# Patient Record
Sex: FEMALE | Race: WHITE | NOT HISPANIC OR LATINO | Employment: OTHER | URBAN - METROPOLITAN AREA
[De-identification: names, ages, dates, MRNs, and addresses within clinical notes are randomized per-mention and may not be internally consistent; named-entity substitution may affect disease eponyms.]

---

## 2021-08-09 ENCOUNTER — TRANSFERRED RECORDS (OUTPATIENT)
Dept: HEALTH INFORMATION MANAGEMENT | Facility: CLINIC | Age: 66
End: 2021-08-09

## 2021-08-11 ENCOUNTER — TRANSFERRED RECORDS (OUTPATIENT)
Dept: HEALTH INFORMATION MANAGEMENT | Facility: CLINIC | Age: 66
End: 2021-08-11

## 2021-10-04 ENCOUNTER — TRANSFERRED RECORDS (OUTPATIENT)
Dept: HEALTH INFORMATION MANAGEMENT | Facility: CLINIC | Age: 66
End: 2021-10-04

## 2022-02-23 ENCOUNTER — TRANSFERRED RECORDS (OUTPATIENT)
Dept: HEALTH INFORMATION MANAGEMENT | Facility: CLINIC | Age: 67
End: 2022-02-23

## 2022-02-23 LAB
CHOLESTEROL (EXTERNAL): 187 MG/DL
CREATININE (EXTERNAL): 0.9 MG/DL (ref 0.51–0.95)
GFR ESTIMATED (EXTERNAL): >60 ML/MIN/1.73M2
GLUCOSE (EXTERNAL): 104 MG/DL (ref 70–99)
HDLC SERPL-MCNC: 76 MG/DL
LDL CHOLESTEROL CALCULATED (EXTERNAL): 92 MG/DL
NON HDL CHOLESTEROL (EXTERNAL): 111 MG/DL (ref 0–1130)
POTASSIUM (EXTERNAL): 4.2 MMOL/L (ref 3.5–5.1)
TRIGLYCERIDES (EXTERNAL): 94 MG/DL

## 2022-05-05 ENCOUNTER — TRANSFERRED RECORDS (OUTPATIENT)
Dept: HEALTH INFORMATION MANAGEMENT | Facility: CLINIC | Age: 67
End: 2022-05-05

## 2022-07-29 ENCOUNTER — OFFICE VISIT (OUTPATIENT)
Dept: FAMILY MEDICINE | Facility: CLINIC | Age: 67
End: 2022-07-29
Payer: MEDICARE

## 2022-07-29 VITALS
OXYGEN SATURATION: 98 % | DIASTOLIC BLOOD PRESSURE: 76 MMHG | BODY MASS INDEX: 26.22 KG/M2 | SYSTOLIC BLOOD PRESSURE: 116 MMHG | TEMPERATURE: 98.7 F | WEIGHT: 148 LBS | HEIGHT: 63 IN | HEART RATE: 76 BPM | RESPIRATION RATE: 20 BRPM

## 2022-07-29 DIAGNOSIS — G47.00 INSOMNIA, UNSPECIFIED TYPE: ICD-10-CM

## 2022-07-29 DIAGNOSIS — L98.9 PRECANCEROUS SKIN LESION: ICD-10-CM

## 2022-07-29 DIAGNOSIS — Z76.89 ENCOUNTER TO ESTABLISH CARE: Primary | ICD-10-CM

## 2022-07-29 DIAGNOSIS — N95.1 VAGINAL DRYNESS, MENOPAUSAL: ICD-10-CM

## 2022-07-29 PROCEDURE — 99204 OFFICE O/P NEW MOD 45 MIN: CPT | Mod: GC

## 2022-07-29 RX ORDER — TRAZODONE HYDROCHLORIDE 50 MG/1
50 TABLET, FILM COATED ORAL AT BEDTIME
Qty: 90 TABLET | Refills: 3 | Status: SHIPPED | OUTPATIENT
Start: 2022-07-29 | End: 2023-08-15

## 2022-07-29 RX ORDER — ZOLPIDEM TARTRATE 5 MG/1
5 TABLET ORAL
Qty: 30 TABLET | Refills: 1 | Status: SHIPPED | OUTPATIENT
Start: 2022-07-29 | End: 2023-01-23

## 2022-07-29 RX ORDER — TRAZODONE HYDROCHLORIDE 50 MG/1
50 TABLET, FILM COATED ORAL AT BEDTIME
Qty: 90 UNITS | Refills: 3 | Status: SHIPPED | OUTPATIENT
Start: 2022-07-29 | End: 2022-07-29

## 2022-07-29 NOTE — PROGRESS NOTES
Assessment & Plan     Encounter to establish care  Patient is new to the area from [place], and is seeking to find a new clinic. She has followed regularly for her health prior to the move. ZOË forms were filled out, and patient plans to get scheduled for a medicare wellness visit soon.     Insomnia, unspecified type  Patient has struggled with insomnia for years. She had taken zolpidem alone for a while, and then became more dependent on it. She was able to wean herself down to only taking it a couple of times a month by regularly taking trazodone. This sleep routine works well for her. Will refill these medications today.   - zolpidem (AMBIEN) 5 MG tablet  Dispense: 30 tablet; Refill: 1  - traZODone (DESYREL) 50 MG tablet  Dispense: 90 tablet; Refill: 3    Precancerous skin lesion  Patient regularly followed with a dermatologist in [place] for precancerous skin lesions to the face that required 6 month regular check ins. She would like a referral to be established with a dermatologist here.   - Adult Dermatology Referral    Vaginal dryness, menopausal  - conjugated estrogens (PREMARIN) 0.625 MG/GM vaginal cream  Dispense: 30 g; Refill: 2    Gia Slaughter MD  [facility]    Matt Munoz is a 67 year old, presenting for the following health issues:  Establish Care ([facility] (new ))    HPI     Establish care  -  Just moved this last month from [place], [place]. Have three children; 2 [place]  just retired. She has two grandchildren, ages 5 and 2, and wanted to be able to spend more time with them. She and her  are living in [place].  -  Last time seeing a physician: followed closely with her regular provider in [place]; was last seen for a physical in the fall of 2021  -  PMH: insomnia, precancerous skin lesions  -  Any specific concerns today: Sleep; takes trazodone nightly and zolpidem as needed. Needs the  "zolpidem more when traveling, doesn't need it much when in stable schedule. Also takes Unisom and melatonin.   -  Patient also notes that she regularly uses an estrogen cream and would like a refill. She went through menopause at age 54.   -  Patient plans to return for medicare wellness visit at some point. She hasn't had a medicare wellness visit yet.   -  Prevention screening: Colonoscopy in 2018, DEXA scan in 2022, Pap smear in 2021, Mammogram in 2022    Review of Systems   Constitutional, HEENT, cardiovascular, pulmonary, gi and gu systems are negative, except as otherwise noted.      Objective    /76   Pulse 76   Temp 98.7  F (37.1  C) (Oral)   Resp 20   Ht 1.6 m (5' 2.99\")   Wt 67.1 kg (148 lb)   SpO2 98%   BMI 26.22 kg/m    Body mass index is 26.22 kg/m .  Physical Exam   GENERAL: Healthy, alert and no distress  EYES: Eyes grossly normal to inspection.  No discharge or erythema, or obvious scleral/conjunctival abnormalities.  RESP: No audible wheeze, cough, or visible cyanosis.  No visible retractions or increased work of breathing.    SKIN: Visible skin clear. No significant rash, abnormal pigmentation or lesions.  NEURO: Cranial nerves grossly intact.  Mentation and speech appropriate for age.  PSYCH: Mentation appears normal, affect normal/bright, judgement and insight intact, normal speech and appearance well-groomed.    "

## 2022-08-01 NOTE — PROGRESS NOTES
Preceptor Attestation:  Patient's case reviewed and discussed with the resident, Gia Slaughter MD, and I personally evaluated the patient. I agree with written assessment and plan of care.    Supervising Physician:  Josselyn Ordonez MD   [facility]

## 2022-08-09 ASSESSMENT — ENCOUNTER SYMPTOMS
HEMATURIA: 0
EYE PAIN: 0
FEVER: 0
JOINT SWELLING: 0
NERVOUS/ANXIOUS: 0
FREQUENCY: 1
DIZZINESS: 0
COUGH: 0
HEMATOCHEZIA: 1
WEAKNESS: 0
SORE THROAT: 0
SHORTNESS OF BREATH: 0
NAUSEA: 0
ARTHRALGIAS: 0
CONSTIPATION: 1
PALPITATIONS: 0
MYALGIAS: 0
HEARTBURN: 0
PARESTHESIAS: 0
BREAST MASS: 0
HEADACHES: 0
ABDOMINAL PAIN: 0
DIARRHEA: 0
DYSURIA: 0
CHILLS: 0

## 2022-08-09 ASSESSMENT — ACTIVITIES OF DAILY LIVING (ADL): CURRENT_FUNCTION: NO ASSISTANCE NEEDED

## 2022-08-12 ENCOUNTER — OFFICE VISIT (OUTPATIENT)
Dept: FAMILY MEDICINE | Facility: CLINIC | Age: 67
End: 2022-08-12
Payer: MEDICARE

## 2022-08-12 ENCOUNTER — OFFICE VISIT (OUTPATIENT)
Dept: FAMILY MEDICINE | Facility: CLINIC | Age: 67
End: 2022-08-12

## 2022-08-12 VITALS
RESPIRATION RATE: 18 BRPM | SYSTOLIC BLOOD PRESSURE: 120 MMHG | DIASTOLIC BLOOD PRESSURE: 74 MMHG | HEART RATE: 71 BPM | OXYGEN SATURATION: 98 % | TEMPERATURE: 97.5 F

## 2022-08-12 DIAGNOSIS — K59.00 CONSTIPATION, UNSPECIFIED CONSTIPATION TYPE: ICD-10-CM

## 2022-08-12 DIAGNOSIS — Z00.00 WELLNESS EXAMINATION: Primary | ICD-10-CM

## 2022-08-12 DIAGNOSIS — Z00.00 MEDICARE ANNUAL WELLNESS VISIT, INITIAL: Primary | ICD-10-CM

## 2022-08-12 DIAGNOSIS — Z12.31 ENCOUNTER FOR SCREENING MAMMOGRAM FOR BREAST CANCER: ICD-10-CM

## 2022-08-12 DIAGNOSIS — R73.03 PREDIABETES: ICD-10-CM

## 2022-08-12 DIAGNOSIS — R35.0 FREQUENT URINATION: ICD-10-CM

## 2022-08-12 DIAGNOSIS — Z11.59 NEED FOR HEPATITIS C SCREENING TEST: ICD-10-CM

## 2022-08-12 LAB
HBA1C MFR BLD: 5.8 % (ref 0–5.6)
HCV AB SERPL QL IA: NONREACTIVE

## 2022-08-12 PROCEDURE — G0438 PPPS, INITIAL VISIT: HCPCS | Performed by: FAMILY MEDICINE

## 2022-08-12 PROCEDURE — 99207 PR NO BILLABLE SERVICE THIS VISIT: CPT

## 2022-08-12 PROCEDURE — 83036 HEMOGLOBIN GLYCOSYLATED A1C: CPT | Performed by: FAMILY MEDICINE

## 2022-08-12 PROCEDURE — 86803 HEPATITIS C AB TEST: CPT | Performed by: FAMILY MEDICINE

## 2022-08-12 PROCEDURE — 36415 COLL VENOUS BLD VENIPUNCTURE: CPT | Performed by: FAMILY MEDICINE

## 2022-08-12 ASSESSMENT — ACTIVITIES OF DAILY LIVING (ADL): CURRENT_FUNCTION: NO ASSISTANCE NEEDED

## 2022-08-12 NOTE — PROGRESS NOTES
Medicare Wellness Visit  Health Risk Assessment           Health Risk Assessment / Review of Systems     Constitutional: Any fevers or night sweats? No     Eyes:  Vision problems   No     Hearing Do you feel you have hearing loss?  No     Cardiovascular: Any chest pain, fast or irregular heart beat, calf pain with walking?     No           Respiratory:   Any breathing problems or cough?   No     Gastrointestinal: Any stomach or stool problems?    YES - has had hx constipation x last few years. Regimen Tammy has been taking is 2 fiber pills and 2 stool softeners every night for past few years. This regimen has been effective in managing constipation.     Genitourinary: Do you have difficulty controlling urination?    YES -has noticed having to urinate more often than usual, no dysuria. Occasionally will have some urinary leakage twice a week.    Muscles and Joints: Any joint stiffness or soreness?   No     Skin: Any concerning lesions or moles?   No     Nervous System: Any loss of strength or feeling, numbness or tingling, shaking, dizziness, or headache?  No     Mental Health: Any depression, anxiety or problems sleeping?     YES -dx sleep apnea, uses CPAP. Takes Trazodone 50 mg every night and occasionally as needed would take Ambien 5mg in addition to Trazodone.    Cognition: Do you have any problems with your memory?  No            Medical Care     What other specialists or organizations are involved in your medical care?  Dermatology  Patient Care Team       Relationship Specialty Notifications Start End    Gia Slaughter MD PCP - General Student in organized health care education/training program  7/25/22     Phone: [phone] Fax: [phone]         [facility] [address]    Ghassan Whatley MD MD Dermatology  7/29/22     Phone: [phone] Fax: [phone]         [address] [facility] [address]          Have you been to the ER or overnight in the hospital in the last year?  No           Social History / Home Safety       Marital Status:  Who lives in your household? Self and , temporarily daughter, her  and 2 children-ages 3 and 5 are staying with Tammy as their home in [place] is being renovated.    Do you feel threatened or controlled by a partner, ex-partner or anyone in your life? No     Has anyone hurt you physically, for example by pushing, hitting, slapping or kicking you   or forcing you to have sex? No          Does your home have any of the following safety concerns; loose rugs in the hallway,  bathrooms with no grab bars by the tub or toilet, stairs with no handrails or poorly lit areas?  Yes,no grab bars in the bathroom    Do you need help with dressing yourself, bathing, eating or getting around your home?  No     Do you need help with the phone, transportation, shopping, preparing meals, housework, laundry, medications or managing money?No       Risk Behaviors and Healthy Habits     History   Smoking Status     Not on file   Smokeless Tobacco     Not on file     How many servings of fruits and vegetables do you eat a day? 3-4 servings a day    Exercise: None No exercise routine at the moment. Recently moved from [place] back to [place] 7/4/2022 and has been trying to get settled into new [place].    Do you frequently drive without a seatbelt? No     Do you use tobacco?  No     Do you use any other drugs? No         Do you use alcohol?Yes  Number of drinks per day :1 wine or christy  Number of drinking days a week :1-2       Frailty Assessment            Have you lost 10 or more pounds unintentionally in the previous year? No     How difficult is walking from one room to the other on the same level?not       Is it difficult to lift or carry something as heavy as 10 pounds?not      Compared with most (men/women) your age, would you say  that you are more active, less active, or about the same? more        FALL RISK ASSESSMENT 8/12/2022 7/29/2022   Fallen 2  or more times in the past year? No No   Any fall with injury in the past year? No No   Timed Up and Go Test/Seconds (13.5 is a fall risk; contact physician) 7 -         Advance Directives:   Discussed with patient and family as appropriate. Has patient  completed advance directives and/or a living will? No, blank copy of an advance directive has been given to patient to take home, review and complete it at her convenience.      Ofe Perez RN

## 2022-08-12 NOTE — Clinical Note
Baljit Corral   Tammy reported to me at her visit 8/12/2022 that she completed release of information paperwork for her previous primary care doctor at her visit on 7/29/22. I'm not seeing any paperwork scanned in. Would this still be in the process of being scanned? Can you help me sleuth this out? If we've misplaced it, I'd like her to come back and fill it out again so we can sort out her preventive health care records.   Thanks for your help or your redirection!  Josselyn Ordonez MD

## 2022-08-12 NOTE — PATIENT INSTRUCTIONS
PERSONAL PREVENTIVE SERVICES PLAN - SERVICES     Review these tests with your medical staff then decide which ones you want and take this page home for your reference      SCREENING TESTS     Description   Year of Last Screening   Recommended Today?   Heart disease screening blood tests  Cholesterol level Reducing cholesterol can reduce your risk of heart attacks by 25%.  Screening is recommended yearly if you are at risk of heart disease otherwise every 4-5 years  Yes; Recommended    Diabetes screening tests  Hemoglobin A1c blood test   Finding and treating diabetes early can reduce complications.  Screening recommended/covered yearly if you have high blood pressure, high cholesterol, obesity (BMI >30), or a history of high blood glucose tests; or 2 of the following: family history of diabetes, overweight (BMI >25 but <30), age 65 years or older, and a history of diabetes of pregnancy or gave birth to baby weighing more than 9 lbs.  No: is not indicated today.   Hepatitis B screening Finding hepatitis B early can reduce complications.  Screening is recommended for persons with selected risk factors.  No: is not indicated today.   Hepatitis C screening Finding hepatitis C early can reduce complications.  Screening is recommended for all persons born from 1945 through 1965 and for those with selected other risk factors.   Yes; Recommended    HIV screening Finding HIV early can reduce complications.  Screening is recommended for persons with risk factors for HIV infection.  No: is not indicated today.   Glaucoma screening Early detection of glaucoma can prevent blindness.   Please talk to your eye doctor about this.       SCREENING TESTS     Description   Year of Last Screening   Recommended Today?   Colorectal cancer screening  Fecal occult blood test   Screening colonoscopy Screening for colon cancer has been shown to reduce death from colon cancer by 25-30%. Screening recommended to start at 50 years and  continuing until age 75 years.    Yes; Recommended    Breast Cancer Screening (women)  Mammogram Mammogram screening for breast cancer has been shown to reduce the risk of dying from breast cancer and prolong life. Screening is recommended every 1-2 years for women aged 50 to 74 years.   Yes; Recommended MAMMO BUS 9/8/22   Cervical Cancer screening (women)  Pap Cervical pap smears can reduce cervical cancer. Screening is recommended annually if high risk (history of abnormal pap smears) otherwise every 2-3 years, stop screening at 65 years of age if history of normal paps.  No: is not indicated today.   Screening for Osteoporosis:  Bone mass measurements (women)  Dexa Scan Screening and treating Osteoporosis can reduce the risk of hip and spine fractures. Screening is recommended in women 65 years or older and in women and men at risk of osteoporosis.  Yes; Recommended    Screening for Lung Cancer   Low-dose CT scanning Screening can reduce mortality in persons aged 55-80 who have smoked at least 30 pack-years and who are either still smoking or have quit in the past 15 years.  No: is not indicated today.   Abdominal Aortic Aneurysm (AAA) screening  Ultrasound (US)   An aneurysm treated before rupture is very safe -a ruptured aneurysm can be fatal.  Screening  by US for AAA is limited to patients who meet one of the following criteria:  Men who are 65-75 years old and have smoked more than 100 cigarettes in their lifetime  Anyone with a family history of abdominal aortic aneurysm  No: is not indicated today.     Here are your recommended immunizations.  Take this home for your reference.                                                    IMMUNIZATIONS Description Recommend today?     Influenza (Flu shot) Prevents flu; should get every year No: is not indicated today.   PCV 20 Pneumonia vaccination; you get it once Done   PPSV 23 Second pneumonia vaccination; usually get it 1 year after PCV 13 No: is not indicated  today.   Zoster (Shingles) Prevents shingles; you get it once  (Check with Part D insurance for coverage, must receive at a pharmacy, not clinic) Yes; Recommended   Tetanus Prevents tetanus; once every 10 years Will check records     Hepatitis B  If you have any of the following risk factors you should be immunized for hepatitis B: severe kidney disease, people who live in the same house as a carrier of Hepatitis B virus, people who live in  institutions (e.g. nursing homes or group homes), homosexual men, patients with hemophilia who received Factor VIII or IX concentrates, abusers of illicit injectable drugs No: is not indicated today.      PATIENT INSTRUCTIONS    Yearly exam:   See your health care provider every year in order to review changes in your health, review medicines that you take, and discuss preventive care needs such as immunizations and cancer screening.  Get a flu shot each year.     Advance Directives:  If you have not done so, you are encouraged to complete advance directives and/or a living will.   More information about advance directives can be found at: http://www.[place]med.org/advocacy/Key-Issues/Advance-Directives    Nutrition:   Eat at least 5 servings of fruits and vegetables each day.   Eat whole-grain bread, whole-wheat pasta and brown rice instead of white grains and rice.   Talk to your doctor about Calcium and Vitamin D.     Lifestyle:  Exercise for at least 150 minutes a week (30 minutes a day, 5 days a week). This will help you control your weight and prevent disease.   Limit alcohol to one drink per day.   If you smoke, try to quit - your doctor will be happy to help.   Wear sunscreen to prevent skin cancer.   See your dentist every six months for an exam and cleaning.   See your eye doctor every 1 to 2 years to screen for conditions such as glaucoma, macular degeneration and cataracts.

## 2022-08-12 NOTE — PROGRESS NOTES
"SUBJECTIVE:   Tammy Dennis is a 67 year old female who presents for Preventive Visit.    Patient has been advised of split billing requirements and indicates understanding: Yes  Are you in the first 12 months of your Medicare coverage?    Yes,  Visual Acuity:  Right Eye: normal   Left Eye: normal  Both Eyes: normal    Healthy Habits:     In general, how would you rate your overall health?  Good    Frequency of exercise:  2-3 days/week    Duration of exercise:  N/A    Do you usually eat at least 4 servings of fruit and vegetables a day, include whole grains    & fiber and avoid regularly eating high fat or \"junk\" foods?  Yes    Taking medications regularly:  Yes    Medication side effects:  None    Ability to successfully perform activities of daily living:  No assistance needed    Home Safety:  Lack of grab bars in the bathroom    Hearing Impairment:  Difficulty understanding soft or whispered speech    In the past 6 months, have you been bothered by leaking of urine? Yes    In general, how would you rate your overall mental or emotional health?  Good      PHQ-2 Total Score: 0    Additional concerns today:  No       Last routine blood work done in Jan or Feb. 2021 to monitor progression of prediabetes. Due for this today again. Has been working on lifestyle changes.     Not sure of last cholesterol, but likely in the past 4-5 years. No history of abnormal cholesterol or CVD.    Mammogram is coming due and she's open to doing this routine screening in September.     Colorectal cancer screening was around age 61 yr. Maybe had some polyps but then her PCP said to space out to 10 yr. Need to review records from her previous PCP.     Last pap smear was at age 66 yr and was normal.     DEXA scan was done and she has a history of osteopenia. Does this every 2 years. DEXA was in the last year.     Chronic constipation - stable with meds. Has not changed recently. See RN notes.     Urinary urgency and frequency is " a chronic issue. Has tried meds before and they don't help. She struggles to get enough water in due to this symptom.    Vaccines:  Had a pneumonia shot earlier this year  Up to date on Tdap but not sure on timing  Due for Shingles    Do you feel safe in your environment? Yes    Have you ever done Advance Care Planning? (For example, a Health Directive, POLST, or a discussion with a medical provider or your loved ones about your wishes): No, advance care planning information given to patient to review.  Patient plans to discuss their wishes with loved ones or provider.      Fall risk  Fallen 2 or more times in the past year?: No  Any fall with injury in the past year?: No  Timed Up and Go Test (>13.5 is fall risk; contact physician) : 7    Cognitive Screening   1) Repeat 3 items (Leader, Season, Table)    2) Clock draw: NORMAL  3) 3 item recall: Recalls 3 objects  Results: 3 items recalled: COGNITIVE IMPAIRMENT LESS LIKELY    Mini-CogTM Copyright S Sharif. Licensed by the author for use in [facility]; reprinted with permission (jesus alberto@[facility]). All rights reserved.      Do you have sleep apnea, excessive snoring or daytime drowsiness?: no    Reviewed and updated as needed this visit by clinical staff   Tobacco  Allergies  Meds     Fam Hx            Reviewed and updated as needed this visit by Provider     Meds     Fam Hx           Social History     Tobacco Use     Smoking status: Never Smoker     Smokeless tobacco: Never Used   Substance Use Topics     Alcohol use: Not Currently       Alcohol Use 8/9/2022   Prescreen: >3 drinks/day or >7 drinks/week? No       Patient Care Team:  Josselyn Ordonez MD as PCP - General (Family Medicine)  Ghassan Whatley MD as MD (Dermatology)    The following health maintenance items are reviewed in Epic and correct as of today:  Health Maintenance Due   Topic Date Due     DEXA  Never done     MAMMO SCREENING  Never done     COLORECTAL CANCER SCREENING   "Never done     DTAP/TDAP/TD IMMUNIZATION (1 - Tdap) Never done     LIPID  Never done     ZOSTER IMMUNIZATION (1 of 2) Never done     Pneumococcal Vaccine: 65+ Years (1 - PCV) Never done     Mammogram Screening: Recommended mammography every 1-2 years with patient discussion and risk factor consideration. No prior abnormal results reported. Awaiting outside records.     Review of Systems  Constitutional, HEENT, cardiovascular, pulmonary, gi and gu systems are negative, except as otherwise noted.    OBJECTIVE:   /74   Pulse 71   Temp 97.5  F (36.4  C) (Oral)   Resp 18   SpO2 98%  Estimated body mass index is 26.22 kg/m  as calculated from the following:    Height as of 7/29/22: 1.6 m (5' 2.99\").    Weight as of 7/29/22: 67.1 kg (148 lb).  Physical Exam  GENERAL: healthy, alert and no distress  PSYCH: mentation appears normal, affect normal/bright    Diagnostic Test Results:  none     ASSESSMENT / PLAN:       ICD-10-CM    1. Medicare annual wellness visit, initial  Z00.00    2. Prediabetes  R73.03 Hemoglobin A1c     Hemoglobin A1c   3. Need for hepatitis C screening test  Z11.59 Hepatitis C antibody     Hepatitis C antibody   4. Encounter for screening mammogram for breast cancer  Z12.31 MA SCREENING DIGITAL BILAT   5. Frequent urination  R35.0    6. Constipation, unspecified constipation type  K59.00      Due for A1c to trend prediabetes. Last A1c was just above the normal range in Jan/Feb. Working on lifestyle changes but not focused on this right now since she just completed a move. Chronic constipation managed with prescription and OTC medications. Urinary frequency/urgency is chronic and likely reflects underlying urge incontinence. Briefly discussed pelvic floor PT and bladder training, avoiding irritants, and medication options. She will consider and return to discuss as needed.     COUNSELING:  Reviewed preventive health counseling, as reflected in patient instructions       Regular exercise       " "Healthy diet/nutrition       Vision screening       Dental care       Bladder control       Osteoporosis prevention/bone health       Colon cancer screening       Advanced Planning     Estimated body mass index is 26.22 kg/m  as calculated from the following:    Height as of 7/29/22: 1.6 m (5' 2.99\").    Weight as of 7/29/22: 67.1 kg (148 lb).    Weight management plan: Discussed healthy diet and exercise guidelines    She reports that she has never smoked. She has never used smokeless tobacco.      Appropriate preventive services were discussed with this patient, including applicable screening as appropriate for cardiovascular disease, diabetes, osteopenia/osteoporosis, and glaucoma.  As appropriate for age/gender, discussed screening for colorectal cancer, prostate cancer, breast cancer, and cervical cancer. Checklist reviewing preventive services available has been given to the patient.    Reviewed patients plan of care and provided an AVS. The Basic Care Plan (routine screening as documented in Health Maintenance) for Tammy meets the Care Plan requirement. This Care Plan has been established and reviewed with the Patient.    Counseling Resources:  ATP IV Guidelines  Pooled Cohorts Equation Calculator  Breast Cancer Risk Calculator  Breast Cancer: Medication to Reduce Risk  FRAX Risk Assessment  ICSI Preventive Guidelines  Dietary Guidelines for Americans, 2010  USDA's MyPlate  ASA Prophylaxis  Lung CA Screening    April KATHLEEN Ordoenz MD  [facility]    Identified Health Risks:  "

## 2022-09-27 ENCOUNTER — OFFICE VISIT (OUTPATIENT)
Dept: FAMILY MEDICINE | Facility: CLINIC | Age: 67
End: 2022-09-27
Payer: MEDICARE

## 2022-09-27 VITALS
HEART RATE: 78 BPM | OXYGEN SATURATION: 98 % | SYSTOLIC BLOOD PRESSURE: 121 MMHG | DIASTOLIC BLOOD PRESSURE: 76 MMHG | RESPIRATION RATE: 20 BRPM | TEMPERATURE: 98.6 F

## 2022-09-27 DIAGNOSIS — Z20.822 CLOSE EXPOSURE TO COVID-19 VIRUS: Primary | ICD-10-CM

## 2022-09-27 DIAGNOSIS — Z20.822 EXPOSURE TO COVID-19 VIRUS: ICD-10-CM

## 2022-09-27 DIAGNOSIS — B34.9 VIRAL ILLNESS: ICD-10-CM

## 2022-09-27 LAB
FLUAV AG SPEC QL IA: NEGATIVE
FLUBV AG SPEC QL IA: NEGATIVE

## 2022-09-27 PROCEDURE — 99213 OFFICE O/P EST LOW 20 MIN: CPT | Mod: CS | Performed by: MASSAGE THERAPIST

## 2022-09-27 PROCEDURE — U0005 INFEC AGEN DETEC AMPLI PROBE: HCPCS | Performed by: MASSAGE THERAPIST

## 2022-09-27 PROCEDURE — U0003 INFECTIOUS AGENT DETECTION BY NUCLEIC ACID (DNA OR RNA); SEVERE ACUTE RESPIRATORY SYNDROME CORONAVIRUS 2 (SARS-COV-2) (CORONAVIRUS DISEASE [COVID-19]), AMPLIFIED PROBE TECHNIQUE, MAKING USE OF HIGH THROUGHPUT TECHNOLOGIES AS DESCRIBED BY CMS-2020-01-R: HCPCS | Performed by: MASSAGE THERAPIST

## 2022-09-27 PROCEDURE — 87804 INFLUENZA ASSAY W/OPTIC: CPT | Performed by: MASSAGE THERAPIST

## 2022-09-27 NOTE — PROGRESS NOTES
1. Close exposure to COVID-19 virus  2. Cough  3. Viral illness  4. Exposure to COVID-19 virus  Grandson tested +2 days ago, onset of symptoms late in the day Sunday, progressed on Monday.  Overall mild and even somewhat improving already today.  However given her age she would qualify for Paxlovid if her COVID test is positive. If positive will sent to [facility] pharmacy.   - Symptomatic; Unknown COVID-19 Virus (Coronavirus) by PCR; Future  - Influenza A & B Antigen  - Symptomatic; Unknown COVID-19 Virus (Coronavirus) by PCR Nose        Matt Munoz is a 67 year old, presenting for the following health issues:  Covid exposure  (Covid exposure )      HPI     COVID-19 Symptom Review  Grandson has covid, diagnosed 2 days ago  She got her BiV lead was started about 2 weeks ago      How many days ago did these symptoms start?  In the evening of 9/25  Runny nose, scratchy throat, fatigue, mild nonproductive cough.      Are any of the following symptoms significant for you?    New or worsening difficulty breathing? No    Worsening cough? Yes, it's a dry cough.     Fever or chills? No    Headache: No    Sore throat: YES    Chest pain: No    Diarrhea: No    Body aches? YES    What treatments has patient tried? Nonsteroidals and Decongestant - nasal   Does patient live in a nursing home, group home, or shelter? No  Does patient have a way to get food/medications during quarantined? Yes, I have a friend or family member who can help me.      uasom and melatonin at night   Multivitamin, calcium, vit b12, vit d, vit c      Objective    /76   Pulse 78   Temp 98.6  F (37  C) (Tympanic)   Resp 20   SpO2 98%   There is no height or weight on file to calculate BMI.  Physical Exam   GENERAL: alert and no distress  RESP: speaking in full sentences, normal work of breathing   CV: extremities well perfused  ABDOMEN: soft, nontender  MS: no gross musculoskeletal defects noted, no edema  PSYCH: mentation  appears normal, affect normal/bright    Results for orders placed or performed in visit on 09/27/22 (from the past 24 hour(s))   Influenza A & B Antigen    Specimen: Nose; Swab   Result Value Ref Range    Influenza A antigen Negative Negative    Influenza B antigen Negative Negative    Narrative    Test results must be correlated with clinical data. If necessary, results should be confirmed by a molecular assay or viral culture.       ----- Service Performed and Documented by Resident or Fellow ------

## 2022-09-27 NOTE — PATIENT INSTRUCTIONS
COVID-19 Outpatient Treatments  Your care team can help you find the best treatments for COVID-19. Talk to a health care provider or refer to the FDA medicine fact sheets below.    Important: You CAN'T have molnupiravir or Paxlovid if you are starting the medicine more than 5 days after your symptoms have started.  Paxlovid: https://www.fda.gov/media/322356/download  Molnupiravir: https://www.fda.gov/media/213204/download  Monoclonal antibodies: https://combatcovid.hhs.gov/what-are-monoclonal-antibodies  Paxlovid (nimatrelvir and ritonavir)  How it works  Two medicines (nirmatrelvir and ritonavir) are taken together. They stop the virus from growing. Less amount of virus is easier for your body to fight.  Benefits  Lowers risk of a hospital stay or death from COVID-19.  How to take    Medicine comes in a daily container with both medicine tablets. Take by mouth twice daily (once in the morning, once at night) for 5 days.    The number of tablets to take varies by patient.    Don't chew or break capsules. Swallow whole.  When to take  Take as soon as possible after positive COVID-19 test result, and within 5 days of your first symptoms.  Who can take it  Patients must be 12 years or older, weigh at least 88 pounds, and have tested positive for COVID-19. This is the preferred treatment for pregnant patients.  Possible side effects  Can cause altered sense of taste, diarrhea (loose, watery stools), high blood pressure, muscle aches.  Medicine conflicts    Some medicines may conflict with Paxlovid and may cause serious side effects.    Tell your care team about all the medicines you take, including prescription and over-the-counter medicines, vitamins and herbal supplements.    Your provider will review your medicines to make sure that you can safely take Paxlovid.  Cautions    Paxlovid is not advised for patients with severe kidney or liver disease. If you have kidney or liver problems, the dose may need to be  changed.    If you are pregnant or breastfeeding, talk to your care team about your options.    If you are sexually active, use trusted birth control while taking Paxlovid.  Molnupiravir  How it works  Stops the virus from growing. Less amount of virus is easier for your body to fight.  Benefits  Lowers risk of a hospital stay or death from COVID-19.  How to take  Take 4 capsules by mouth every 12 hours (4 in the morning and 4 at night) for 5 days. Don't chew or break capsules. Swallow whole.  When to take  Take as soon as possible after positive COVID-19 test result, and within 5 days of your first symptoms.  Who can take it  Patients must be 18 years or older and have tested positive for COVID-19.  Possible side effects  Diarrhea (loose, watery stools), nausea (feeling sick to your stomach), dizziness, headaches.  Medicine conflicts  Right now, there are no known conflicts with other drugs. But tell your care team all medicines you take.  Cautions    This is not advised for patients who are pregnant.    Patients who could become pregnant should use trusted birth control until 4 days after their last dose.    Sexually active people of any gender should use trusted birth control for 3 months after their last dose.  Monoclonal antibodies  How it works  Monoclonal antibodies can detect pieces of the COVID virus and stop it from infecting your cells.  Benefits  Lowers risk of a hospital stay or death from COVID-19. Monoclonal antibodies are known to work well against the omicron variant.  How it is given to you  You will receive the treatment either by an infusion through your vein (IV) or shots.  When to take  Get as soon as possible after you test positive for COVID-19, and within 7 days of your first symptoms.  Who can take it  Patients must be 12 years or older, weigh at least 88 pounds and have tested positive for COVID-19.  Possible side effects  Fever, chills, diarrhea (loose, watery stools), dizziness,  itchiness and rash.  More serious side effects include: fever, difficulty breathing, low oxygen level in your blood, chills, tiredness, fast or slow heart rate, chest discomfort or pain, weakness, confusion, nausea, headache, shortness of breath, low or high blood pressure, wheezing, swelling of your lips, face, or throat, rash including hives, itching, muscle aches, dizziness, feeling faint and sweating.  If you receive an IV, you may have brief pain, bleeding and bruising of the skin, soreness, swelling and possible infection at the place where you get the IV needle.  Medicine conflicts  Please tell you care team other medicines you take so they can assess if there are any conflicts.  Cautions  Your doctor will talk with you about risks and benefits of this treatment and will help choose the best option for you.  For informational purposes only. Not to replace the advice of your health care provider.  Copyright   2022 [facility]. All rights reserved. Clinically reviewed by Saba Zuleta. [employer] 400049 - 08/22.

## 2022-09-28 LAB — SARS-COV-2 RNA RESP QL NAA+PROBE: NEGATIVE

## 2022-10-03 ENCOUNTER — HEALTH MAINTENANCE LETTER (OUTPATIENT)
Age: 67
End: 2022-10-03

## 2022-10-05 NOTE — PROGRESS NOTES
Preceptor Attestation:   Patient seen, evaluated and discussed with the resident Dr. Cerna. I have verified the content of the note, which accurately reflects my assessment of the patient and the plan of care.  Supervising Physician:Benjamin Rosenstein, MD, MA  [facility]

## 2022-10-10 ENCOUNTER — OFFICE VISIT (OUTPATIENT)
Dept: DERMATOLOGY | Facility: CLINIC | Age: 67
End: 2022-10-10
Attending: FAMILY MEDICINE
Payer: MEDICARE

## 2022-10-10 DIAGNOSIS — I78.1 TELANGIECTASIA: ICD-10-CM

## 2022-10-10 DIAGNOSIS — L57.0 ACTINIC KERATOSIS: Primary | ICD-10-CM

## 2022-10-10 DIAGNOSIS — L82.1 SEBORRHEIC KERATOSIS: ICD-10-CM

## 2022-10-10 DIAGNOSIS — D22.9 MULTIPLE MELANOCYTIC NEVI: ICD-10-CM

## 2022-10-10 PROCEDURE — 99202 OFFICE O/P NEW SF 15 MIN: CPT | Mod: 25 | Performed by: DERMATOLOGY

## 2022-10-10 PROCEDURE — 17000 DESTRUCT PREMALG LESION: CPT | Performed by: DERMATOLOGY

## 2022-10-10 ASSESSMENT — PAIN SCALES - GENERAL: PAINLEVEL: NO PAIN (0)

## 2022-10-10 NOTE — NURSING NOTE
Dermatology Rooming Note    Tammy Dennis's goals for this visit include:   Chief Complaint   Patient presents with     Skin Check     Tammy is here today for a skin check. She does not have any areas of concern.     Mindy Frost, [facility]

## 2022-10-10 NOTE — PROGRESS NOTES
[facility] Dermatology Note    Encounter Date: Oct 10, 2022    Dermatology Problem List:  1. Actinic keratoses - cryo, prior 5-fluorouracil (outside dermatologist)    ______________________________________    Impression/Plan:  1. Actinic keratosis: on the right brow. Cryotherapy today.  - Cryotherapy procedure note: After verbal consent and discussion of risks and benefits including, but not limited to, dyspigmentation/scar, blister, and pain, 1 was(were) treated with 1-2 mm freeze border for 1-2 cycles with liquid nitrogen. Post cryotherapy instructions were provided.     2. Reassurance provided for benign lesions not treated today including a telangiectasia, seborrheic keratoses, and banal-appearing melanocytic nevi.        Follow-up in 1 year.       Staff Involved:  Staff Only    Ghassan Whatley MD   of Dermatology  Department of Dermatology  [facility]      CC:   Chief Complaint   Patient presents with     Skin Check     Tammy is here today for a skin check. She does not have any areas of concern.       History of Present Illness:  Ms. Tammy Dennis is a 67 year old female who presents as a new patient.    Red spot on nose x1 year  - a few new moles on cheek  - has had prior actinic keratoses - cryotherapy, 5-fluorouracil cream  - no family history of skin cancer  - watching one on the back    Labs:  N/A    Physical exam:  Vitals: There were no vitals taken for this visit.  GEN: This is a well developed, well-nourished female in no acute distress, in a pleasant mood.    SKIN: Mauro phototype II  - Full skin, which includes the head/face, both arms, chest, back, abdomen,both legs,  buttocks, digits and/or nails, was examined.  - erythematous scaly macule on the right brow  - stuck-on appearing brown papule on the left posterior shoulder  - few evenly pigmented brown macules on the head/neck, trunk, extremities  - bright red  macule on the right medial cheek  - No other lesions of concern on areas examined.     Past Medical History:   History reviewed. No pertinent past medical history.  Past Surgical History:   Procedure Laterality Date     FOOT SURGERY Right 2018    2 plates and 12 screws placed, fracture       Social History:   reports that she has never smoked. She has never used smokeless tobacco. She reports that she does not currently use alcohol. She reports that she does not currently use drugs.    Family History:  Family History   Problem Relation Age of Onset     Melanoma No family hx of      Skin Cancer No family hx of        Medications:  Current Outpatient Medications   Medication Sig Dispense Refill     conjugated estrogens (PREMARIN) 0.625 MG/GM vaginal cream Place 1 g vaginally twice a week 30 g 2     traZODone (DESYREL) 50 MG tablet Take 1 tablet (50 mg) by mouth At Bedtime 90 tablet 3     zolpidem (AMBIEN) 5 MG tablet Take 1 tablet (5 mg) by mouth nightly as needed for sleep 30 tablet 1     Allergies   Allergen Reactions     Bees      Cats      Oxycodone

## 2022-10-10 NOTE — LETTER
10/10/2022       RE: Tammy Dennis  [address] [place] [address]     Dear Colleague,    Thank you for referring your patient, Tammy Dennis, to the [facility] [place] at [facility]. Please see a copy of my visit note below.    [facility] Dermatology Note    Encounter Date: Oct 10, 2022    Dermatology Problem List:  1. Actinic keratoses - cryo, prior 5-fluorouracil (outside dermatologist)    ______________________________________    Impression/Plan:  1. Actinic keratosis: on the right brow. Cryotherapy today.  - Cryotherapy procedure note: After verbal consent and discussion of risks and benefits including, but not limited to, dyspigmentation/scar, blister, and pain, 1 was(were) treated with 1-2 mm freeze border for 1-2 cycles with liquid nitrogen. Post cryotherapy instructions were provided.     2. Reassurance provided for benign lesions not treated today including a telangiectasia, seborrheic keratoses, and banal-appearing melanocytic nevi.        Follow-up in 1 year.       Staff Involved:  Staff Only    Ghassan Whatley MD   of Dermatology  Department of Dermatology  [facility]      CC:   Chief Complaint   Patient presents with     Skin Check     Tammy is here today for a skin check. She does not have any areas of concern.       History of Present Illness:  Ms. Tammy Dennis is a 67 year old female who presents as a new patient.    Red spot on nose x1 year  - a few new moles on cheek  - has had prior actinic keratoses - cryotherapy, 5-fluorouracil cream  - no family history of skin cancer  - watching one on the back    Labs:  N/A    Physical exam:  Vitals: There were no vitals taken for this visit.  GEN: This is a well developed, well-nourished female in no acute distress, in a pleasant mood.    SKIN: Mauro phototype  II  - Full skin, which includes the head/face, both arms, chest, back, abdomen,both legs,  buttocks, digits and/or nails, was examined.  - erythematous scaly macule on the right brow  - stuck-on appearing brown papule on the left posterior shoulder  - few evenly pigmented brown macules on the head/neck, trunk, extremities  - bright red macule on the right medial cheek  - No other lesions of concern on areas examined.     Past Medical History:   History reviewed. No pertinent past medical history.  Past Surgical History:   Procedure Laterality Date     FOOT SURGERY Right 2018    2 plates and 12 screws placed, fracture       Social History:   reports that she has never smoked. She has never used smokeless tobacco. She reports that she does not currently use alcohol. She reports that she does not currently use drugs.    Family History:  Family History   Problem Relation Age of Onset     Melanoma No family hx of      Skin Cancer No family hx of        Medications:  Current Outpatient Medications   Medication Sig Dispense Refill     conjugated estrogens (PREMARIN) 0.625 MG/GM vaginal cream Place 1 g vaginally twice a week 30 g 2     traZODone (DESYREL) 50 MG tablet Take 1 tablet (50 mg) by mouth At Bedtime 90 tablet 3     zolpidem (AMBIEN) 5 MG tablet Take 1 tablet (5 mg) by mouth nightly as needed for sleep 30 tablet 1     Allergies   Allergen Reactions     Bees      Cats      Oxycodone

## 2022-10-17 PROBLEM — Z12.11 SCREENING FOR COLORECTAL CANCER: Status: ACTIVE | Noted: 2022-10-17

## 2022-10-17 PROBLEM — Z13.820 SCREENING FOR OSTEOPOROSIS: Status: ACTIVE | Noted: 2022-10-17

## 2022-10-17 PROBLEM — Z12.12 SCREENING FOR COLORECTAL CANCER: Status: ACTIVE | Noted: 2022-10-17

## 2022-10-17 PROBLEM — Z12.39 BREAST CANCER SCREENING: Status: ACTIVE | Noted: 2022-10-17

## 2022-11-14 ENCOUNTER — TELEPHONE (OUTPATIENT)
Dept: FAMILY MEDICINE | Facility: CLINIC | Age: 67
End: 2022-11-14

## 2022-11-14 DIAGNOSIS — Z12.31 ENCOUNTER FOR SCREENING MAMMOGRAM FOR BREAST CANCER: Primary | ICD-10-CM

## 2022-11-14 PROBLEM — Z87.2 HISTORY OF ACTINIC KERATOSES: Status: ACTIVE | Noted: 2022-11-14

## 2022-11-14 NOTE — TELEPHONE ENCOUNTER
Can we please call Tammy to inform her of the following:    I reviewed her records from her previous primary care office. She is due for her annual screening mammogram. I placed an order for this that can be scheduled at her earliest convenience.     Thanks!    Josselyn Ordonez MD

## 2022-11-18 ENCOUNTER — OFFICE VISIT (OUTPATIENT)
Dept: FAMILY MEDICINE | Facility: CLINIC | Age: 67
End: 2022-11-18
Payer: MEDICARE

## 2022-11-18 VITALS
DIASTOLIC BLOOD PRESSURE: 70 MMHG | SYSTOLIC BLOOD PRESSURE: 127 MMHG | HEIGHT: 63 IN | WEIGHT: 145 LBS | OXYGEN SATURATION: 99 % | TEMPERATURE: 98.1 F | RESPIRATION RATE: 16 BRPM | HEART RATE: 81 BPM | BODY MASS INDEX: 25.69 KG/M2

## 2022-11-18 DIAGNOSIS — J01.90 ACUTE SINUSITIS WITH SYMPTOMS > 10 DAYS: Primary | ICD-10-CM

## 2022-11-18 LAB
FLUAV AG SPEC QL IA: NEGATIVE
FLUBV AG SPEC QL IA: NEGATIVE
HOLD SPECIMEN: NORMAL

## 2022-11-18 PROCEDURE — 99213 OFFICE O/P EST LOW 20 MIN: CPT | Mod: CS

## 2022-11-18 PROCEDURE — U0003 INFECTIOUS AGENT DETECTION BY NUCLEIC ACID (DNA OR RNA); SEVERE ACUTE RESPIRATORY SYNDROME CORONAVIRUS 2 (SARS-COV-2) (CORONAVIRUS DISEASE [COVID-19]), AMPLIFIED PROBE TECHNIQUE, MAKING USE OF HIGH THROUGHPUT TECHNOLOGIES AS DESCRIBED BY CMS-2020-01-R: HCPCS

## 2022-11-18 PROCEDURE — U0005 INFEC AGEN DETEC AMPLI PROBE: HCPCS

## 2022-11-18 PROCEDURE — 87804 INFLUENZA ASSAY W/OPTIC: CPT

## 2022-11-18 RX ORDER — ZOLPIDEM TARTRATE 5 MG/1
5 TABLET ORAL
Qty: 30 TABLET | Refills: 1 | Status: CANCELLED | OUTPATIENT
Start: 2022-11-18

## 2022-11-18 NOTE — PROGRESS NOTES
"  Assessment & Plan     Acute sinusitis with symptoms > 10 days  Patient with biphasic pattern of illness and about ten days in length. Will treat as acute sinusitis with a course of Augmentin. Discussed symptomatic treatment. Return precautions given.   - Influenza A & B Antigen  - Symptomatic; Unknown COVID-19 Virus (Coronavirus) by PCR; Future  - Extra Tube; Future  - Symptomatic; Unknown COVID-19 Virus (Coronavirus) by PCR Nasopharyngeal  - Extra Tube  - amoxicillin-clavulanate (AUGMENTIN) 875-125 MG tablet; Take 1 tablet by mouth 2 times daily    Return if symptoms worsen or fail to improve.    Ally Sebastian MD  [facility]      Matt Munoz is a 67 year old, presenting for the following health issues:  RECHECK (Sick for week, temp on/off, congestion, cough, teeth hurt, skin hurts at night, fatigue- lives with grandchild in kindergarden)      HPI     URI symptoms  - states she has had one week of runny nose, sore throat, headache  - living with grandchildren that have been intermittently sick, tested negative for covid  - notes mild URI symptoms, improvement, then worsening facial pain, purulent discharge from her nose, and subjective fevers  - she is using OTC cough and cold medicine which helps a little    Review of Systems   Constitutional, HEENT, cardiovascular, pulmonary, gi and gu systems are negative, except as otherwise noted.      Objective    /70   Pulse 81   Temp 98.1  F (36.7  C) (Oral)   Resp 16   Ht 1.6 m (5' 3\")   Wt 65.8 kg (145 lb)   SpO2 99%   BMI 25.69 kg/m    Body mass index is 25.69 kg/m .  Physical Exam   Gen: Pleasant. Appears sick but not in distress.  HEENT: Purulent discharge present in the nose, oropharynx with erythematous changes, pain to palpation to the frontal sinus, ear exams normal.  Neck: No overt asymmetry.   CV: Appears well-perfused. RRR. No murmur.   Resp: Breathing comfortably on room air. Lungs clear to auscultation " bilaterally without wheeze or crackle.   Abd: Non-distended, non-tender.   Ext: No edema or overt asymmetry/deformity.   Skin: No overt rash on easily visualized skin.   Neuro: Non-focal.   Psych: Calm.

## 2022-11-19 LAB — SARS-COV-2 RNA RESP QL NAA+PROBE: NEGATIVE

## 2022-11-25 PROBLEM — Z13.220 LIPID SCREENING: Status: ACTIVE | Noted: 2022-11-25

## 2022-11-28 ENCOUNTER — ANCILLARY PROCEDURE (OUTPATIENT)
Dept: MAMMOGRAPHY | Facility: HOSPITAL | Age: 67
End: 2022-11-28
Attending: FAMILY MEDICINE
Payer: MEDICARE

## 2022-11-28 DIAGNOSIS — Z12.31 ENCOUNTER FOR SCREENING MAMMOGRAM FOR BREAST CANCER: ICD-10-CM

## 2022-11-28 PROCEDURE — 77067 SCR MAMMO BI INCL CAD: CPT

## 2022-11-29 ENCOUNTER — TELEPHONE (OUTPATIENT)
Dept: FAMILY MEDICINE | Facility: CLINIC | Age: 67
End: 2022-11-29

## 2022-12-05 NOTE — PROGRESS NOTES
Preceptor Attestation:  Patient's case reviewed and discussed with Ally Sebastian MD resident and I evaluated the patient. I agree with written assessment and plan of care.  Supervising Physician:  IVY VALDEZ MD  [facility]

## 2023-01-23 ENCOUNTER — OFFICE VISIT (OUTPATIENT)
Dept: FAMILY MEDICINE | Facility: CLINIC | Age: 68
End: 2023-01-23
Payer: MEDICARE

## 2023-01-23 VITALS
DIASTOLIC BLOOD PRESSURE: 60 MMHG | SYSTOLIC BLOOD PRESSURE: 124 MMHG | HEIGHT: 63 IN | BODY MASS INDEX: 27.12 KG/M2 | WEIGHT: 153.04 LBS | HEART RATE: 94 BPM | OXYGEN SATURATION: 96 % | TEMPERATURE: 98 F

## 2023-01-23 DIAGNOSIS — G47.00 INSOMNIA, UNSPECIFIED TYPE: ICD-10-CM

## 2023-01-23 DIAGNOSIS — M26.69: Primary | ICD-10-CM

## 2023-01-23 DIAGNOSIS — G47.33 OBSTRUCTIVE SLEEP APNEA SYNDROME: ICD-10-CM

## 2023-01-23 DIAGNOSIS — R73.03 PREDIABETES: ICD-10-CM

## 2023-01-23 DIAGNOSIS — N89.8 DRYNESS OF VAGINA: ICD-10-CM

## 2023-01-23 PROCEDURE — 99215 OFFICE O/P EST HI 40 MIN: CPT | Performed by: FAMILY MEDICINE

## 2023-01-23 RX ORDER — ZOLPIDEM TARTRATE 5 MG/1
2.5 TABLET ORAL
Qty: 30 TABLET | Refills: 1 | Status: SHIPPED | OUTPATIENT
Start: 2023-01-23 | End: 2023-08-15

## 2023-01-23 NOTE — PROGRESS NOTES
Call to [facility].   Rx: E3/T/HA 0.5/0.5/12 mg/gram     Confirm:   E3 = Estriol 0.5 mg/gram  T = Testosterone 0.5 mg/gram  HA = Hyaluronic acid 12 mg/gram  Insert 1 gram into vagina twice weekly or up to 4x/weekly as needed  Total 50 grams per refill    Karyn Candelaria, Pharm.D., [facility]  Phone: [phone]  January 23, 2023 at 1:33 PM

## 2023-01-23 NOTE — PROGRESS NOTES
Assessment & Plan     Mandibular dysfunction  Suspect related to mandibular appliance use for UNRULY. In care of dental specialist and advised to pursue PT referral. Referral sent today.   - Physical Therapy Referral; Future    Obstructive sleep apnea syndrome  Using mandibular appliance. Due for repeat sleep study test soon.   - Continue current treatment.   - Follow up for sleep study as advised by sleep medicine specialist.     Insomnia, unspecified type  Longstanding issue. Nightly treatment for UNRULY with mandibular appliance. Using trazodone nightly also. PRN Ambien low-dose when unable to quiet thoughts, especially with travel.   - Refilled zolpidem (AMBIEN) 5 MG tablet; Take 0.5 tablets (2.5 mg) by mouth nightly as needed for sleep    Prediabetes  A1c of 5.8, last checked in Aug. 2022. Has struggled with lifestyle changes during a hectic life period. Weight is slightly up from August 2022.  - Advised ongoing work with regular exercise and making small dietary changes.   - Follow up later this year to check in on lifestyle changes and alternative approaches to reducing progression to DM II.    Dryness of vagina  Using topical estrogen with good relief. Refills needed. Prescription details confirmed by PharmD and medication sent to a new compounding pharmacy.  - COMPOUNDED NON-CONTROLLED SUBSTANCE (CMPD RX) - PHARMACY TO MIX COMPOUNDED MEDICATION; Estriol/Testosterone/Hyaluronic acid 0.5/0.5/12 mg/gram: Insert 1 gram into vagina twice weekly or up to 4x/weekly as needed    46 minutes spent on the date of the encounter doing chart review, patient visit, documentation and discussion with other provider(s)        Follow up later this year.    Josselyn Ordonez MD  [facility]    Matt Munoz is a 67 year old, presenting for the following health issues:  Referral PT, Refill Request, and Recheck Medication      HPI     She has a history of sleep apnea and wears a mandibular device.  "She was evaluated at [facility] (Dr. Mandi Hodges, a DDS). She is having some jaw dysfunction with some hypermobility of the mandibular joint and was referred by Dr. Hodges, a physical therapy practice with specialists in oral/mandibular dysfunction. Practice is [facility], fax: [phone]. She needs a medical referral for this issue. Some improvement in symptoms with the exercises prescribed by her dentist but would like to learn a few more resources. No clicking, popping or pain at the jaw.     She has been using Ambien as needed for insomnia. She has some of her previous prescription remaining but would like a refill to cover her for some upcoming travel. She tends to take 2.5 mg as needed when she is unable to get to sleep, maybe about 5 times per month. Relying on trazodone most nights. No side effects of concern.     History of prediabetes. Has still struggled with lifestyle changes related to her diet while she has had her family living with her. Is more motivated to make changes soon once her daughter moves out. She has been walking about 2 miles per day.     Would like a refill of the topical estrogen compounded cream that she has used for vaginal dryness relief since menopause from a specialty compounding pharmacy in [place]. Prescription is: E3/T/HA 0.5/0.5/12 mg/gram; Insert 1 gram into vagina twice weekly or up to 4x/weekly as needed; dispense 50 grams    Need to reach out to [facility] in [facility] at [phone] to confirm formulation components.        Objective    /60   Pulse 94   Temp 98  F (36.7  C) (Oral)   Ht 1.6 m (5' 2.99\")   Wt 69.4 kg (153 lb 0.6 oz)   SpO2 96%   BMI 27.12 kg/m    Body mass index is 27.12 kg/m .  Physical Exam   GENERAL: healthy, alert and no distress  RESP: normal rate and effort  PSYCH: mentation appears normal, affect normal/bright    "

## 2023-01-23 NOTE — PATIENT INSTRUCTIONS
- Ambien prescription resent. I agree with taking 2.5 mg as needed rather than the 5 mg dose.    - Therapy referral placed. We typically fax over the referrals     - We'll look into the best way to order your estrogen compounded cream and get back to you.

## 2023-01-25 ENCOUNTER — TRANSFERRED RECORDS (OUTPATIENT)
Dept: HEALTH INFORMATION MANAGEMENT | Facility: CLINIC | Age: 68
End: 2023-01-25
Payer: MEDICARE

## 2023-01-26 ENCOUNTER — TELEPHONE (OUTPATIENT)
Dept: FAMILY MEDICINE | Facility: CLINIC | Age: 68
End: 2023-01-26
Payer: MEDICARE

## 2023-01-26 NOTE — TELEPHONE ENCOUNTER
Due to compound being controlled substance per pharmacy and e-prescribed is not written signature by physician, pharmacy called clinic to request verbal.  This RN gave verbal for prescription prescribed by Dr Ordonez on 1/20/2023 for   COMPOUNDED NON-CONTROLLED SUBSTANCE (CMPD RX) - PHARMACY TO MIX COMPOUNDED MEDICATION 50 g 3 1/23/2023  --   Sig: Estriol/Testosterone/Hyaluronic acid 0.5/0.5/12 mg/gram: Insert 1 gram into vagina twice weekly or up to 4x/weekly as needed     No other intervention is needed at this time. Above information given verbally to Dick, Pharmacist from [facility] ([facility] - [place], [place] - [address] [place] [address].   Yoana SANFORD

## 2023-01-27 ENCOUNTER — ANCILLARY PROCEDURE (OUTPATIENT)
Dept: GENERAL RADIOLOGY | Facility: CLINIC | Age: 68
End: 2023-01-27
Attending: FAMILY MEDICINE
Payer: MEDICARE

## 2023-01-27 ENCOUNTER — OFFICE VISIT (OUTPATIENT)
Dept: FAMILY MEDICINE | Facility: CLINIC | Age: 68
End: 2023-01-27
Payer: MEDICARE

## 2023-01-27 VITALS
DIASTOLIC BLOOD PRESSURE: 74 MMHG | SYSTOLIC BLOOD PRESSURE: 111 MMHG | HEART RATE: 83 BPM | OXYGEN SATURATION: 99 % | RESPIRATION RATE: 12 BRPM

## 2023-01-27 DIAGNOSIS — S99.912A ANKLE INJURY, LEFT, INITIAL ENCOUNTER: ICD-10-CM

## 2023-01-27 DIAGNOSIS — S99.912A LEFT ANKLE INJURY, INITIAL ENCOUNTER: Primary | ICD-10-CM

## 2023-01-27 DIAGNOSIS — Z91.030 ALLERGY TO HONEY BEE VENOM: ICD-10-CM

## 2023-01-27 DIAGNOSIS — S99.912A LEFT ANKLE INJURY, INITIAL ENCOUNTER: ICD-10-CM

## 2023-01-27 DIAGNOSIS — S82.892A ANKLE FRACTURE, LEFT, CLOSED, INITIAL ENCOUNTER: ICD-10-CM

## 2023-01-27 DIAGNOSIS — S99.912A ANKLE INJURY, LEFT, INITIAL ENCOUNTER: Primary | ICD-10-CM

## 2023-01-27 PROCEDURE — 99215 OFFICE O/P EST HI 40 MIN: CPT | Performed by: FAMILY MEDICINE

## 2023-01-27 PROCEDURE — 73610 X-RAY EXAM OF ANKLE: CPT | Mod: TC | Performed by: RADIOLOGY

## 2023-01-27 PROCEDURE — 73610 X-RAY EXAM OF ANKLE: CPT | Mod: LT | Performed by: RADIOLOGY

## 2023-01-27 RX ORDER — EPINEPHRINE 0.3 MG/.3ML
0.3 INJECTION SUBCUTANEOUS
COMMUNITY
Start: 2022-06-09 | End: 2023-01-27

## 2023-01-27 RX ORDER — EPINEPHRINE 0.3 MG/.3ML
0.3 INJECTION SUBCUTANEOUS PRN
Qty: 2 EACH | Refills: 3 | Status: SHIPPED | OUTPATIENT
Start: 2023-01-27 | End: 2023-08-15

## 2023-01-27 NOTE — TELEPHONE ENCOUNTER
DIAGNOSIS: f/u left ankle fracture   APPOINTMENT DATE: 2.2.23   NOTES STATUS DETAILS   OFFICE NOTE from referring provider Internal 1.27.23 Josselyn Ordonez MD   MEDICATION LIST Internal    XRAYS (IMAGES & REPORTS) Internal 1.27.23 L ankle  1.27.23 L ankle

## 2023-01-27 NOTE — PATIENT INSTRUCTIONS
- Remain non-weight bearing on the left ankle with crutches  - Use the CAM boot when you get home to stabilize the fracture  - We will reach out this afternoon with more information on your orthopedic appointment once we hear back from the surgeon on their recommendations

## 2023-01-27 NOTE — PROGRESS NOTES
Assessment & Plan     Ankle injury, left, initial encounter  Ankle fracture, left, closed, initial encounter  Ankle inversion injury on the snow this morning with acute onset of swelling and pain, worse in the lateral vs medial ankle. Xray with left fibular fracture that is unstable given location above the mortise. Discussed care with Dr. Virgen and connected with orthopedic surgery for consultation. Will begin non-weight-bearing now, use CAM boot, and set up for ortho evaluation in the next 24 hours for weight-bearing xray to reassess for stability.   - XR Ankle Left G/E 3 Views; Future  - Crutches Order  - Has CAM boot at home, will call if any issues with this.  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 Due for refill of epi pen.   - EPINEPHrine (ANY BX GENERIC EQUIV) 0.3 MG/0.3ML injection 2-pack; Inject 0.3 mLs (0.3 mg) into the muscle as needed for anaphylaxis    48 minutes spent on the date of the encounter doing chart review, interpretation of tests, patient visit, documentation and discussion with other provider(s) and coordination of care       Follow up as above    April KATHLEEN Ordonez MD  [facility] [place]    Matt Munoz is a 67 year old, accompanied by her , presenting for the following health issues:  Ankle Pain (Left x 1 day slipped this morning. )      HPI     Twisted her left ankle this morning. Stepped out the front door in her slippers and lost her balance on the snow. Rolled her ankle outward/inversion injury. Swelling and pain on the outside of the ankle. Has been able to walk on it but not easily. Significant pain. Some bruising already apparent.     No previous ankle injuries. Has had surgery on her right foot.     Also needs a refill of her epi pen. She is allergic to bee venom and has undergone treatments with an allergist to desensitize but isn't certain if she remains allergic because she  hasn't been stung since the treatments.         Objective    /74 (BP Location: Right arm, Patient Position: Sitting, Cuff Size: Adult Regular)   Pulse 83   Resp 12   SpO2 99%   There is no height or weight on file to calculate BMI.  Physical Exam   GENERAL: healthy, alert and no distress when stationary  CV: regular rate and rhythm, peripheral pulses strong and equal  MS: tenderness to palpation over posterior medial malleolus and lateral malleolus extending on the lateral malleolus up the fibula. Diffuse swelling and ecchymosis of the left lateral ankle.   NEURO: Normal strength and tone, sensory exam grossly normal and mentation intact    Xray - Reviewed and interpreted by me.  Left lateral ankle fracture of the distal fibula above the mortis. Joint line is preserved on non-weight-bearing images.       DME (Durable Medical Equipment) Orders and Documentation  Orders Placed This Encounter   Procedures     Crutches Order      The patient was assessed and it was determined the patient is in need of the following listed DME Supplies/Equipment. Please complete supporting documentation below to demonstrate medical necessity.      DME All Other Item(s) Documentation    List reason for need and supporting documentation for medical necessity below for each DME item.     1. Needs to be non-weight-bearing for left ankle fracture.

## 2023-02-01 NOTE — PROGRESS NOTES
ASSESSMENT/PLAN:    (S82.839D) Displaced fracture of distal end of fibula with routine healing  (primary encounter diagnosis)  Comment: reviewed repeat imaging w/ Dr Acevedo; mortise remains intact on weight-bearing films so we are able to continue current mgmt w/ walking boot; we discussed option of casting vs surgical consult as well  Plan: she elected to remain in boot; precautions given; f/u in 4 wks for repeat imaging and exam     Endy Virgen MD  February 2, 2023  11:28 AM        Pt is a 67 year old female I saw w/ Dr Ordonez at [facility] here today for:     L ankle fracture:   Location: distal fibula   Duration:1 week   Trauma/ Fall? Yes (see note below)   Able to walk? YES - in boot  Swelling? YES   Bruising? YES  Numbness/ Tingling? YES   Imaging? Yes - repeat x-rays today   Treatment? Walking boot     Repeat weight-bearing films done [place] [facility] [place]27[place]23:  Findings:     Standing AP, oblique, and lateral  views of the left ankle were  obtained.      Oblique distal fibular shaft fracture with slight lateral  displacement, similar prior.     Ankle mortise and syndesmosis are congruent on this weight bearing  study. Plantar calcaneal enthesophyte.     Soft tissue is unremarkable.                                                                    Impression:  Unchanged alignment of mildly displaced oblique distal fibular fracture.    Per Dr Ordonez's note on 1/27/23:     Ankle injury, left, initial encounter  Ankle fracture, left, closed, initial encounter  Ankle inversion injury on the snow this morning with acute onset of swelling and pain, worse in the lateral vs medial ankle. Xray with left fibular fracture that is unstable given location above the mortise. Discussed care with Dr. Virgen and connected with orthopedic surgery for consultation. Will begin non-weight-bearing now, use CAM boot, and set up for ortho evaluation in the next 24 hours for weight-bearing xray to reassess for stability.   - XR Ankle Left G/E 3  Views; Future  - Crutches Order  - Has CAM boot at home, will call if any issues with this.  - Orthopedic  Referral; Future      Past Medical History:   Diagnosis Date     History of actinic keratoses 11/14/2022    Previously treated with Efudex     Insomnia 7/27/2010     Prediabetes 8/12/2022    A1c 5.8 in August 2022     Sleep apnea 4/27/1997    Wears mandibular appliance      Past Surgical History:   Procedure Laterality Date     FOOT SURGERY Right 2018    2 plates and 12 screws placed, fracture      Current Outpatient Medications   Medication Sig Dispense Refill     COMPOUNDED NON-CONTROLLED SUBSTANCE (CMPD RX) - PHARMACY TO MIX COMPOUNDED MEDICATION Estriol/Testosterone/Hyaluronic acid 0.5/0.5/12 mg/gram: Insert 1 gram into vagina twice weekly or up to 4x/weekly as needed 50 g 3     conjugated estrogens (PREMARIN) 0.625 MG/GM vaginal cream Place 1 g vaginally twice a week (Patient not taking: Reported on 11/18/2022) 30 g 2     EPINEPHrine (ANY BX GENERIC EQUIV) 0.3 MG/0.3ML injection 2-pack Inject 0.3 mLs (0.3 mg) into the muscle as needed for anaphylaxis 2 each 3     traZODone (DESYREL) 50 MG tablet Take 1 tablet (50 mg) by mouth At Bedtime 90 tablet 3     zolpidem (AMBIEN) 5 MG tablet Take 0.5 tablets (2.5 mg) by mouth nightly as needed for sleep 30 tablet 1      Allergies   Allergen Reactions     Bees      Cats      Oxycodone       ROS:   Gen- no fevers/chills   Rheum - no morning stiffness   Derm - no rash/ redness   Neuro - no numbness, no tingling   Remainder of ROS negative.     Exam:   There were no vitals taken for this visit.     L ankle:  Neurovascularly intact      Xray of L ankle on February 2, 2023 at [facility] location - films personally reviewed with patient at time of visit     My impression: stable mortise     Radiology read:  Findings:     Standing AP, oblique, and lateral  views of the left ankle were  obtained.      Ongoing healing mildly displaced suprasyndesmotic distal  fibular  fracture with persistent visualization of fracture line, unchanged in  alignment. No additional acute fracture.     Small ossicle adjacent to the medial malleolus, free flowing remote  trauma.     Ankle mortise and syndesmosis are congruent.     Small plantar calcaneal enthesopathy. Decreased soft tissue swelling.                                                                    Impression: Ongoing healing mildly displaced suprasyndesmotic distal  fibular fracture with persistent visualization of fracture line,  unchanged in alignment.

## 2023-02-02 ENCOUNTER — PRE VISIT (OUTPATIENT)
Dept: ORTHOPEDICS | Facility: CLINIC | Age: 68
End: 2023-02-02

## 2023-02-02 ENCOUNTER — OFFICE VISIT (OUTPATIENT)
Dept: ORTHOPEDICS | Facility: CLINIC | Age: 68
End: 2023-02-02
Payer: MEDICARE

## 2023-02-02 ENCOUNTER — ANCILLARY PROCEDURE (OUTPATIENT)
Dept: GENERAL RADIOLOGY | Facility: CLINIC | Age: 68
End: 2023-02-02
Attending: FAMILY MEDICINE
Payer: MEDICARE

## 2023-02-02 DIAGNOSIS — M25.562 LEFT KNEE PAIN, UNSPECIFIED CHRONICITY: Primary | ICD-10-CM

## 2023-02-02 DIAGNOSIS — S82.839D: Primary | ICD-10-CM

## 2023-02-02 DIAGNOSIS — M25.562 LEFT KNEE PAIN, UNSPECIFIED CHRONICITY: ICD-10-CM

## 2023-02-02 PROCEDURE — 99204 OFFICE O/P NEW MOD 45 MIN: CPT | Performed by: FAMILY MEDICINE

## 2023-02-02 PROCEDURE — 73610 X-RAY EXAM OF ANKLE: CPT | Mod: LT | Performed by: RADIOLOGY

## 2023-02-02 NOTE — LETTER
2/2/2023      RE: Tammy Dennis  [address] [place] [address]     Dear Colleague,    Thank you for referring your patient, Tammy Dennis, to the [facility] [place]. Please see a copy of my visit note below.    ASSESSMENT/PLAN:    (A51.071A) Displaced fracture of distal end of fibula with routine healing  (primary encounter diagnosis)  Comment: reviewed repeat imaging w/ Dr Acevedo; mortise remains intact on weight-bearing films so we are able to continue current mgmt w/ walking boot; we discussed option of casting vs surgical consult as well  Plan: she elected to remain in boot; precautions given; f/u in 4 wks for repeat imaging and exam     Endy Virgen MD  February 2, 2023  11:28 AM        Pt is a 67 year old female I saw w/ Dr Ordonez at [facility] here today for:     L ankle fracture:   Location: distal fibula   Duration:1 week   Trauma/ Fall? Yes (see note below)   Able to walk? YES - in boot  Swelling? YES   Bruising? YES  Numbness/ Tingling? YES   Imaging? Yes - repeat x-rays today   Treatment? Walking boot     Repeat weight-bearing films done at [facility] on 1/27/23:  Findings:     Standing AP, oblique, and lateral  views of the left ankle were  obtained.      Oblique distal fibular shaft fracture with slight lateral  displacement, similar prior.     Ankle mortise and syndesmosis are congruent on this weight bearing  study. Plantar calcaneal enthesophyte.     Soft tissue is unremarkable.                                                                    Impression:  Unchanged alignment of mildly displaced oblique distal fibular fracture.    Per Dr Ordonez's note on 1/27/23:     Ankle injury, left, initial encounter  Ankle fracture, left, closed, initial encounter  Ankle inversion injury on the snow this morning with acute onset of swelling and pain, worse in the lateral vs medial ankle. Xray with left fibular fracture that is unstable given location  above the mortise. Discussed care with Dr. Virgen and connected with orthopedic surgery for consultation. Will begin non-weight-bearing now, use CAM boot, and set up for ortho evaluation in the next 24 hours for weight-bearing xray to reassess for stability.   - XR Ankle Left G/E 3 Views; Future  - Crutches Order  - Has CAM boot at home, will call if any issues with this.  - Orthopedic  Referral; Future      Past Medical History:   Diagnosis Date     History of actinic keratoses 11/14/2022    Previously treated with Efudex     Insomnia 7/27/2010     Prediabetes 8/12/2022    A1c 5.8 in August 2022     Sleep apnea 4/27/1997    Wears mandibular appliance      Past Surgical History:   Procedure Laterality Date     FOOT SURGERY Right 2018    2 plates and 12 screws placed, fracture      Current Outpatient Medications   Medication Sig Dispense Refill     COMPOUNDED NON-CONTROLLED SUBSTANCE (CMPD RX) - PHARMACY TO MIX COMPOUNDED MEDICATION Estriol/Testosterone/Hyaluronic acid 0.5/0.5/12 mg/gram: Insert 1 gram into vagina twice weekly or up to 4x/weekly as needed 50 g 3     conjugated estrogens (PREMARIN) 0.625 MG/GM vaginal cream Place 1 g vaginally twice a week (Patient not taking: Reported on 11/18/2022) 30 g 2     EPINEPHrine (ANY BX GENERIC EQUIV) 0.3 MG/0.3ML injection 2-pack Inject 0.3 mLs (0.3 mg) into the muscle as needed for anaphylaxis 2 each 3     traZODone (DESYREL) 50 MG tablet Take 1 tablet (50 mg) by mouth At Bedtime 90 tablet 3     zolpidem (AMBIEN) 5 MG tablet Take 0.5 tablets (2.5 mg) by mouth nightly as needed for sleep 30 tablet 1      Allergies   Allergen Reactions     Bees      Cats      Oxycodone       ROS:   Gen- no fevers/chills   Rheum - no morning stiffness   Derm - no rash/ redness   Neuro - no numbness, no tingling   Remainder of ROS negative.     Exam:   There were no vitals taken for this visit.     L ankle:  Neurovascularly intact      Xray of L ankle on February 2, 2023 at [facility]  location - films personally reviewed with patient at time of visit     My impression: stable mortise     Radiology read:  Findings:     Standing AP, oblique, and lateral  views of the left ankle were  obtained.      Ongoing healing mildly displaced suprasyndesmotic distal fibular  fracture with persistent visualization of fracture line, unchanged in  alignment. No additional acute fracture.     Small ossicle adjacent to the medial malleolus, free flowing remote  trauma.     Ankle mortise and syndesmosis are congruent.     Small plantar calcaneal enthesopathy. Decreased soft tissue swelling.                                                                    Impression: Ongoing healing mildly displaced suprasyndesmotic distal  fibular fracture with persistent visualization of fracture line,  unchanged in alignment.       Again, thank you for allowing me to participate in the care of your patient.      Sincerely,    Endy Virgen MD

## 2023-02-06 ENCOUNTER — TELEPHONE (OUTPATIENT)
Dept: FAMILY MEDICINE | Facility: CLINIC | Age: 68
End: 2023-02-06
Payer: MEDICARE

## 2023-02-06 NOTE — TELEPHONE ENCOUNTER
[facility] phone call message- medication clarification/question:    Full Medication Name: EPINEPHrine (ANY BX GENERIC EQUIV)    Dose: 0.3 MG/0.3ML injection 2-pack    Question: Patient called and stated she was told to call clinic back and inform  if medication is too expensive, per patient she went to [facility] to  the medication and was told medication would cost $450, and that's just too expensive for her. Patient would like to know if  would have other pharmacies she can send it to where it would be a cheaper cost. Please call and advise.      Pharmacy confirmed as    [facility] #[facility] [[address]  : Yes    OK to leave a message on voice mail? Yes    Primary language: English      needed? No    Call taken on February 6, 2023 at 2:21 PM by Elana Tamez

## 2023-02-17 NOTE — TELEPHONE ENCOUNTER
After speaking with Dr. Guaman, I would like to pursue a prior authorization for Tammy. I would like to request a tier exception on her plan to allow for a reduced co-pay for patient's epinephrine prescription. Currently she cannot afford her epinephrine and there is no acceptable, alternative agent that can be implemented in the place of this lifesaving medication.     Please let me know what further documentation is needed to pursue this for Tammy.     Josselyn Ordonez MD

## 2023-02-21 ENCOUNTER — TRANSFERRED RECORDS (OUTPATIENT)
Dept: HEALTH INFORMATION MANAGEMENT | Facility: CLINIC | Age: 68
End: 2023-02-21
Payer: MEDICARE

## 2023-02-21 NOTE — TELEPHONE ENCOUNTER
PA Initiation    Medication:   Insurance Company: [employer] - Phone [phone] Fax [phone]  Pharmacy Filling the Rx: [id number] [address]  Filling Pharmacy Phone: [phone]  Filling Pharmacy Fax:    Start Date: 2/21/2023    Central Prior Authorization Team   Phone: [phone]

## 2023-02-22 ENCOUNTER — LAB (OUTPATIENT)
Dept: FAMILY MEDICINE | Facility: CLINIC | Age: 68
End: 2023-02-22
Attending: FAMILY MEDICINE
Payer: MEDICARE

## 2023-02-22 DIAGNOSIS — Z20.822 SUSPECTED 2019 NOVEL CORONAVIRUS INFECTION: ICD-10-CM

## 2023-02-22 LAB — SARS-COV-2 RNA RESP QL NAA+PROBE: POSITIVE

## 2023-02-22 PROCEDURE — U0005 INFEC AGEN DETEC AMPLI PROBE: HCPCS

## 2023-02-22 PROCEDURE — U0003 INFECTIOUS AGENT DETECTION BY NUCLEIC ACID (DNA OR RNA); SEVERE ACUTE RESPIRATORY SYNDROME CORONAVIRUS 2 (SARS-COV-2) (CORONAVIRUS DISEASE [COVID-19]), AMPLIFIED PROBE TECHNIQUE, MAKING USE OF HIGH THROUGHPUT TECHNOLOGIES AS DESCRIBED BY CMS-2020-01-R: HCPCS

## 2023-02-23 ENCOUNTER — TELEPHONE (OUTPATIENT)
Dept: FAMILY MEDICINE | Facility: CLINIC | Age: 68
End: 2023-02-23
Payer: MEDICARE

## 2023-02-23 DIAGNOSIS — U07.1 INFECTION DUE TO 2019 NOVEL CORONAVIRUS: Primary | ICD-10-CM

## 2023-02-23 NOTE — TELEPHONE ENCOUNTER
RN COVID TREATMENT VISIT  02/23/23      The patient has been triaged and does not require a higher level of care.    Tammy Dennis  67 year old  Current weight? 149 lbs    Has the patient been seen by a primary care provider at an [facility] or [facility] within the past two years? Yes.   Have you been in close proximity to/do you have a known exposure to a person with a confirmed case of influenza? No.     General treatment eligibility:  Date of positive COVID test (PCR or at home)?  2/22/2023    Are you or have you been hospitalized for this COVID-19 infection? No.   Have you received monoclonal antibodies or antiviral treatment for COVID-19 since this positive test? No.   Do you have any of the following conditions that place you at risk of being very sick from COVID-19?   - Age 50 years or older  - Overweight or Obesity (BMI >85th percentile or BMI 25 or higher)  Yes, patient has at least one high risk condition as noted above.     Current COVID symptoms:   - fever or chills  - cough  - fatigue  - muscle or body aches  - headache  - sore throat  - congestion or runny nose  Yes. Patient has at least one symptom as selected.     How many days since symptoms started? 5 days or less. Established patient, 12 years or older weighing at least 88.2 lbs, who has symptoms that started in the past 5 days, has not been hospitalized nor received treatment already, and is at risk for being very sick from COVID-19.     Treatment eligibility by RN:    Are you currently pregnant or nursing? No    Do you have a clinically significant hypersensitivity to nirmatrelvir or ritonavir, or toxic epidermal necrolysis (TEN) or Mcneill-Chun Syndrome? No    Do you have a history of hepatitis, any hepatic impairment on the Problem List (such as Child-Cochran Class C, cirrhosis, fatty liver disease, alcoholic liver disease), or was the last liver lab (hepatic panel, ALT, AST, ALK Phos, bilirubin)  elevated in the past 6 months? No    Do you have any history of severe renal impairment (eGFR < 30mL/min)? No    Is patient eligible to continue?   Yes, patient meets all eligibility requirements for the RN COVID treatment (as denoted by all no responses above).     Current Outpatient Medications   Medication Sig Dispense Refill     COMPOUNDED NON-CONTROLLED SUBSTANCE (CMPD RX) - PHARMACY TO MIX COMPOUNDED MEDICATION Estriol/Testosterone/Hyaluronic acid 0.5/0.5/12 mg/gram: Insert 1 gram into vagina twice weekly or up to 4x/weekly as needed 50 g 3     conjugated estrogens (PREMARIN) 0.625 MG/GM vaginal cream Place 1 g vaginally twice a week 30 g 2     EPINEPHrine (ANY BX GENERIC EQUIV) 0.3 MG/0.3ML injection 2-pack Inject 0.3 mLs (0.3 mg) into the muscle as needed for anaphylaxis 2 each 3     traZODone (DESYREL) 50 MG tablet Take 1 tablet (50 mg) by mouth At Bedtime 90 tablet 3     zolpidem (AMBIEN) 5 MG tablet Take 0.5 tablets (2.5 mg) by mouth nightly as needed for sleep 30 tablet 1       Medications from List 1 of the standing order (on medications that exclude the use of Paxlovid) that patient is taking: NONE. Is patient taking Dallas's Wort? No  Is patient taking Dallas's Wort or any meds from List 1? No.   Medications from List 2 of the standing order (on meds that provider needs to adjust) that patient is taking: NONE. Is patient on any of the meds from List 2? No.   Medications from List 3 of standing order (on meds that a RN needs to adjust) that patient is taking: trazodone (Desyrel): Instructed patient to stop taking trazodone while taking Paxlovid and restart trazodone 3 days after the completion of Paxlovid.   zolpidem (Ambien, Intermezzo, Edluar): Is patient taking as needed and less than daily? Yes, instructed patient to stop taking zolpidem while taking Paxlovid and restart zolpidem 3 days after the completion of Paxlovid. Is patient on any meds from List 3? Yes. Patient is on meds from list 3. No  meds require a provider visit and at least one med required RN to adjust.     Paxlovid has an approximate 90% reduction in hospitalization. Paxlovid can possibly cause altered sense of taste, diarrhea (loose, watery stools), high blood pressure, muscle aches.     Would patient like a Paxlovid prescription?   Yes.   No results found for: GFRESTIMATED    Was last eGFR reduced? No, eGFR 60 or greater/ No Result on record. Patient can receive the normal renal function dose. [facility]    Temporary change to home medications: Trazodone and Zolpidem    All medication adjustments (holds, etc) were discussed with the patient and patient was asked to repeat back (teachback) their med adjustment.  Did patient understand med adjustment? Yes, patient repeated back and understood correctly.        Reviewed the following instructions with the patient:    Paxlovid (nimatrelvir and ritonavir)    How it works  Two medicines (nirmatrelvir and ritonavir) are taken together. They stop the virus from growing. Less amount of virus is easier for your body to fight.    How to take    Medicine comes in a daily container with both medicine tablets. Take by mouth twice daily (once in the morning, once at night) for 5 days.    The number of tablets to take varies by patient.    Don't chew or break capsules. Swallow whole.    When to take  Take as soon as possible after positive COVID-19 test result, and within 5 days of your first symptoms.    Possible side effects  Can cause altered sense of taste, diarrhea (loose, watery stools), high blood pressure, muscle aches.    Ofe Perez RN

## 2023-02-23 NOTE — TELEPHONE ENCOUNTER
COVID Positive/Requesting COVID treatment    Patient is positive for COVID and requesting treatment options.    Date of positive COVID test (PCR or at home)? 02/22/2023  Current COVID symptoms: cough, congestion, body aches, feverish  Date COVID symptoms began: 02/22/2023    Message should be routed to clinic RN pool. Best phone number to use for call back: [phone]

## 2023-02-27 DIAGNOSIS — S82.839D: Primary | ICD-10-CM

## 2023-02-28 NOTE — TELEPHONE ENCOUNTER
Update sent to Tammy via [employer] with information about prior auth denial and costs for her EpiPen.    Josselyn Ordonez MD

## 2023-03-01 NOTE — PROGRESS NOTES
ASSESSMENT/PLAN:    (B00.791F) Displaced fracture of distal end of fibula with routine healing  (primary encounter diagnosis)  Comment: stable, healing fx; clinically no tenderness at fracture site and no pain w/ weight-bearing; recommended she wear boot x 1 additional week and wean to a stirrup ankle brace; start PT next week; f/u in 4 wks; precautions given  Plan: Physical Therapy Referral          Endy Virgen MD  March 2, 2023  10:06 AM      Pt is a 67 year old female last seen on 2/2/23 here for follow up of:     L ankle fracture - no pain   As been limiting weight-bearing in boot to 1000 steps/day  Ankle feels stiff       Per my last note:   (Q09.820L) Displaced fracture of distal end of fibula with routine healing  (primary encounter diagnosis)  Comment: reviewed repeat imaging w/ Dr Acevedo; mortise remains intact on weight-bearing films so we are able to continue current mgmt w/ walking boot; we discussed option of casting vs surgical consult as well  Plan: she elected to remain in boot; precautions given; f/u in 4 wks for repeat imaging and exam     Past Medical History:   Diagnosis Date     History of actinic keratoses 11/14/2022    Previously treated with Efudex     Insomnia 7/27/2010     Prediabetes 8/12/2022    A1c 5.8 in August 2022     Sleep apnea 4/27/1997    Wears mandibular appliance      Current Outpatient Medications   Medication Sig Dispense Refill     COMPOUNDED NON-CONTROLLED SUBSTANCE (CMPD RX) - PHARMACY TO MIX COMPOUNDED MEDICATION Estriol/Testosterone/Hyaluronic acid 0.5/0.5/12 mg/gram: Insert 1 gram into vagina twice weekly or up to 4x/weekly as needed 50 g 3     conjugated estrogens (PREMARIN) 0.625 MG/GM vaginal cream Place 1 g vaginally twice a week 30 g 2     EPINEPHrine (ANY BX GENERIC EQUIV) 0.3 MG/0.3ML injection 2-pack Inject 0.3 mLs (0.3 mg) into the muscle as needed for anaphylaxis 2 each 3     traZODone (DESYREL) 50 MG tablet Take 1 tablet (50 mg) by mouth At Bedtime 90 tablet 3      zolpidem (AMBIEN) 5 MG tablet Take 0.5 tablets (2.5 mg) by mouth nightly as needed for sleep 30 tablet 1      Allergies   Allergen Reactions     Bees      Cats      Oxycodone       ROS:   Gen- no fevers/chills   Rheum - no morning stiffness   Derm - no rash/ redness   Neuro - no numbness, no tingling   Remainder of ROS negative.     Exam:     L ankle:  No TTP over distal fibula or mortise  ROM limited in all planes  Mild soft tissue swelling laterally       Xray of L ankle on March 2, 2023 at [facility] location - films personally reviewed with patient at time of visit     My impression: stable alignment, evidence of healing noted      Official read:  Impression:  Ongoing healing distal fibular shaft oblique fracture.

## 2023-03-02 ENCOUNTER — OFFICE VISIT (OUTPATIENT)
Dept: ORTHOPEDICS | Facility: CLINIC | Age: 68
End: 2023-03-02
Payer: MEDICARE

## 2023-03-02 ENCOUNTER — ANCILLARY PROCEDURE (OUTPATIENT)
Dept: GENERAL RADIOLOGY | Facility: CLINIC | Age: 68
End: 2023-03-02
Attending: FAMILY MEDICINE
Payer: MEDICARE

## 2023-03-02 DIAGNOSIS — S82.839D: Primary | ICD-10-CM

## 2023-03-02 DIAGNOSIS — S82.839D: ICD-10-CM

## 2023-03-02 PROCEDURE — 99213 OFFICE O/P EST LOW 20 MIN: CPT | Performed by: FAMILY MEDICINE

## 2023-03-02 PROCEDURE — 73610 X-RAY EXAM OF ANKLE: CPT | Mod: LT | Performed by: RADIOLOGY

## 2023-03-02 NOTE — LETTER
3/2/2023      RE: Tammy Dennis  [address] [place] [address]     Dear Colleague,    Thank you for referring your patient, Tammy Dennis, to the [facility] [place]. Please see a copy of my visit note below.    ASSESSMENT/PLAN:    (I08.434O) Displaced fracture of distal end of fibula with routine healing  (primary encounter diagnosis)  Comment: stable, healing fx; clinically no tenderness at fracture site and no pain w/ weight-bearing; recommended she wear boot x 1 additional week and wean to a stirrup ankle brace; start PT next week; f/u in 4 wks; precautions given  Plan: Physical Therapy Referral          Endy Virgen MD  March 2, 2023  10:06 AM      Pt is a 67 year old female last seen on 2/2/23 here for follow up of:     L ankle fracture - no pain   As been limiting weight-bearing in boot to 1000 steps/day  Ankle feels stiff       Per my last note:   (H19.544Q) Displaced fracture of distal end of fibula with routine healing  (primary encounter diagnosis)  Comment: reviewed repeat imaging w/ Dr Acevedo; mortise remains intact on weight-bearing films so we are able to continue current mgmt w/ walking boot; we discussed option of casting vs surgical consult as well  Plan: she elected to remain in boot; precautions given; f/u in 4 wks for repeat imaging and exam     Past Medical History:   Diagnosis Date     History of actinic keratoses 11/14/2022    Previously treated with Efudex     Insomnia 7/27/2010     Prediabetes 8/12/2022    A1c 5.8 in August 2022     Sleep apnea 4/27/1997    Wears mandibular appliance      Current Outpatient Medications   Medication Sig Dispense Refill     COMPOUNDED NON-CONTROLLED SUBSTANCE (CMPD RX) - PHARMACY TO MIX COMPOUNDED MEDICATION Estriol/Testosterone/Hyaluronic acid 0.5/0.5/12 mg/gram: Insert 1 gram into vagina twice weekly or up to 4x/weekly as needed 50 g 3     conjugated estrogens (PREMARIN) 0.625 MG/GM vaginal  cream Place 1 g vaginally twice a week 30 g 2     EPINEPHrine (ANY BX GENERIC EQUIV) 0.3 MG/0.3ML injection 2-pack Inject 0.3 mLs (0.3 mg) into the muscle as needed for anaphylaxis 2 each 3     traZODone (DESYREL) 50 MG tablet Take 1 tablet (50 mg) by mouth At Bedtime 90 tablet 3     zolpidem (AMBIEN) 5 MG tablet Take 0.5 tablets (2.5 mg) by mouth nightly as needed for sleep 30 tablet 1      Allergies   Allergen Reactions     Bees      Cats      Oxycodone       ROS:   Gen- no fevers/chills   Rheum - no morning stiffness   Derm - no rash/ redness   Neuro - no numbness, no tingling   Remainder of ROS negative.     Exam:     L ankle:  No TTP over distal fibula or mortise  ROM limited in all planes  Mild soft tissue swelling laterally       [place] [facility] [place] personally reviewed with patient at time of visit     My impression: stable alignment, evidence of healing noted      Official read:  Impression:  Ongoing healing distal fibular shaft oblique fracture.       Again, thank you for allowing me to participate in the care of your patient.      Sincerely,    Endy Virgen MD

## 2023-03-09 ENCOUNTER — HOSPITAL ENCOUNTER (OUTPATIENT)
Dept: PHYSICAL THERAPY | Facility: REHABILITATION | Age: 68
Discharge: HOME | End: 2023-03-09
Attending: FAMILY MEDICINE
Payer: MEDICARE

## 2023-03-09 DIAGNOSIS — S82.839D: ICD-10-CM

## 2023-03-09 PROCEDURE — 97110 THERAPEUTIC EXERCISES: CPT | Mod: GP | Performed by: PHYSICAL THERAPIST

## 2023-03-09 PROCEDURE — 97161 PT EVAL LOW COMPLEX 20 MIN: CPT | Mod: GP | Performed by: PHYSICAL THERAPIST

## 2023-03-09 NOTE — ADDENDUM NOTE
Encounter addended by: Tanvi Bonilla, PT on: 3/9/2023 2:11 PM   Actions taken: Clinical Note Signed, Document created, Document edited

## 2023-03-09 NOTE — PROGRESS NOTES
[facility]    OUTPATIENT PHYSICAL THERAPY ORTHOPEDIC EVALUATION  PLAN OF TREATMENT FOR OUTPATIENT REHABILITATION  (COMPLETE FOR INITIAL CLAIMS ONLY)  Patient's Last Name, First Name, M.I.  YOB: 1955  KhaideepthiTammy Leon    Provider s Name:  [facility]   Medical Record No.  [id number]   Start of Care Date:  03/09/23   Onset Date:  01/27/23   Type:     _X__PT   ___OT   ___SLP Medical Diagnosis:  Displaced fracture of distal end of fibula with routine healing     PT Diagnosis:  Left Ankle Pain   Visits from SOC:  1      _________________________________________________________________________________  Plan of Treatment/Functional Goals:  gait training, joint mobilization, manual therapy, neuromuscular re-education, strengthening, ROM, stretching     Cryotherapy, TENS     Goals  Goal Identifier: LEFS:  Goal Description: LEFS score will improve by atleast 10 points indicating improved pain, strength and function.  Target Date: 05/04/23    Goal Identifier: Walking  Goal Description: The patient will be able to walk at least 30 minutes without brace with ankle pain <3/10  Target Date: 05/04/23    Goal Identifier: standing  Goal Description: The patient will be able to stand for at least 30 minutes continuously without her brace demonstrating improved strength and function  Target Date: 04/06/23             Therapy Frequency:  1 time/week  Predicted Duration of Therapy Intervention:  8 weeks    RAKESH MICHELE, PT                 I CERTIFY THE NEED FOR THESE SERVICES FURNISHED UNDER        THIS PLAN OF TREATMENT AND WHILE UNDER MY CARE     (Physician co-signature of this document indicates review and certification of the therapy plan).                       Certification Date From:  03/09/23   Certification Date To:  05/04/23    Referring Provider:  Endy Virgen  MD    Initial Assessment        See Epic Evaluation Start of Care Date: 03/09/23

## 2023-03-15 ENCOUNTER — HOSPITAL ENCOUNTER (OUTPATIENT)
Dept: PHYSICAL THERAPY | Facility: REHABILITATION | Age: 68
Discharge: HOME | End: 2023-03-15
Payer: MEDICARE

## 2023-03-15 DIAGNOSIS — S82.839D: Primary | ICD-10-CM

## 2023-03-15 PROCEDURE — 97112 NEUROMUSCULAR REEDUCATION: CPT | Mod: GP | Performed by: PHYSICAL THERAPIST

## 2023-03-15 PROCEDURE — 97110 THERAPEUTIC EXERCISES: CPT | Mod: GP | Performed by: PHYSICAL THERAPIST

## 2023-03-22 ENCOUNTER — HOSPITAL ENCOUNTER (OUTPATIENT)
Dept: PHYSICAL THERAPY | Facility: REHABILITATION | Age: 68
Discharge: HOME | End: 2023-03-22
Payer: MEDICARE

## 2023-03-22 DIAGNOSIS — S82.839D: Primary | ICD-10-CM

## 2023-03-22 PROCEDURE — 97112 NEUROMUSCULAR REEDUCATION: CPT | Mod: GP | Performed by: PHYSICAL THERAPIST

## 2023-03-22 PROCEDURE — 97110 THERAPEUTIC EXERCISES: CPT | Mod: GP | Performed by: PHYSICAL THERAPIST

## 2023-03-22 PROCEDURE — 97140 MANUAL THERAPY 1/> REGIONS: CPT | Mod: GP | Performed by: PHYSICAL THERAPIST

## 2023-03-29 ENCOUNTER — HOSPITAL ENCOUNTER (OUTPATIENT)
Dept: PHYSICAL THERAPY | Facility: REHABILITATION | Age: 68
Discharge: HOME | End: 2023-03-29
Payer: MEDICARE

## 2023-03-29 DIAGNOSIS — S82.839D: Primary | ICD-10-CM

## 2023-03-29 PROCEDURE — 97112 NEUROMUSCULAR REEDUCATION: CPT | Mod: GP | Performed by: PHYSICAL THERAPIST

## 2023-03-29 PROCEDURE — 97110 THERAPEUTIC EXERCISES: CPT | Mod: GP | Performed by: PHYSICAL THERAPIST

## 2023-03-29 PROCEDURE — 97140 MANUAL THERAPY 1/> REGIONS: CPT | Mod: GP | Performed by: PHYSICAL THERAPIST

## 2023-04-10 ENCOUNTER — ANCILLARY PROCEDURE (OUTPATIENT)
Dept: GENERAL RADIOLOGY | Facility: CLINIC | Age: 68
End: 2023-04-10
Attending: FAMILY MEDICINE
Payer: MEDICARE

## 2023-04-10 ENCOUNTER — OFFICE VISIT (OUTPATIENT)
Dept: ORTHOPEDICS | Facility: CLINIC | Age: 68
End: 2023-04-10
Payer: MEDICARE

## 2023-04-10 DIAGNOSIS — S82.839D: ICD-10-CM

## 2023-04-10 DIAGNOSIS — S82.839D: Primary | ICD-10-CM

## 2023-04-10 PROCEDURE — 99213 OFFICE O/P EST LOW 20 MIN: CPT | Performed by: FAMILY MEDICINE

## 2023-04-10 PROCEDURE — 73610 X-RAY EXAM OF ANKLE: CPT | Mod: LT | Performed by: RADIOLOGY

## 2023-04-10 NOTE — PROGRESS NOTES
ASSESSMENT/PLAN:    (S82.839D) Displaced fracture of distal end of fibula with routine healing  (primary encounter diagnosis)  Comment: fx is clinically healed; she is ok to inc activity as tolerated; precautions given; f/u prn  Plan: X-ray lt ankle G/E 3 views*          Endy Virgen MD  April 10, 2023  10:17 AM      Pt is a 68 year old female last seen on 3/2/23 here for follow up of:     L ankle fx - quit using the ankle brace 2 weeks ago  Walks 1.1 miles daily -> aches which go away after resting; up to 8-9,000 steps/day  Going to [place] next week -> walks a lot in [place] and wants to be able to do that      Per last PT note on 3/29/23:  Pt stopped wearing her brace most of the time. yesterday walked 7,500 total steps - did a longer walk outside 1 mile with the brace on, but not using it in the house. The brace is causing more pain than it is helping with. Today there is no pain, but some fatigue.    Per my last note:  (S82.839D) Displaced fracture of distal end of fibula with routine healing  (primary encounter diagnosis)  Comment: stable, healing fx; clinically no tenderness at fracture site and no pain w/ weight-bearing; recommended she wear boot x 1 additional week and wean to a stirrup ankle brace; start PT next week; f/u in 4 wks; precautions given  Plan: Physical Therapy Referral    Past Medical History:   Diagnosis Date     History of actinic keratoses 11/14/2022    Previously treated with Efudex     Insomnia 7/27/2010     Prediabetes 8/12/2022    A1c 5.8 in August 2022     Sleep apnea 4/27/1997    Wears mandibular appliance      Current Outpatient Medications   Medication Sig Dispense Refill     COMPOUNDED NON-CONTROLLED SUBSTANCE (CMPD RX) - PHARMACY TO MIX COMPOUNDED MEDICATION Estriol/Testosterone/Hyaluronic acid 0.5/0.5/12 mg/gram: Insert 1 gram into vagina twice weekly or up to 4x/weekly as needed 50 g 3     conjugated estrogens (PREMARIN) 0.625 MG/GM vaginal cream Place 1 g vaginally twice a week  30 g 2     EPINEPHrine (ANY BX GENERIC EQUIV) 0.3 MG/0.3ML injection 2-pack Inject 0.3 mLs (0.3 mg) into the muscle as needed for anaphylaxis 2 each 3     traZODone (DESYREL) 50 MG tablet Take 1 tablet (50 mg) by mouth At Bedtime 90 tablet 3     zolpidem (AMBIEN) 5 MG tablet Take 0.5 tablets (2.5 mg) by mouth nightly as needed for sleep 30 tablet 1      Allergies   Allergen Reactions     Bees      Cats      Oxycodone       ROS:   Gen- no fevers/chills   Rheum - no morning stiffness   Derm - no rash/ redness   Neuro - no numbness, no tingling   Remainder of ROS negative.     Exam:     L ankle:   Full ROM  Strength 5/5  No TTP at fx site  Able to single leg stand w/o pain    Xray of L ankle on April 10, 2023 at [facility] [place] location - films personally reviewed with patient at time of visit     My impression: stable/ healing distal fibula fx

## 2023-04-10 NOTE — LETTER
4/10/2023      RE: Tammy Dennis  [address] [place] [address]     Dear Colleague,    Thank you for referring your patient, Tammy Dennis, to the [facility] [place]. Please see a copy of my visit note below.    ASSESSMENT/PLAN:    (R33.214N) Displaced fracture of distal end of fibula with routine healing  (primary encounter diagnosis)  Comment: fx is clinically healed; she is ok to inc activity as tolerated; precautions given; f/u prn  Plan: X-ray lt ankle G/E 3 views*          Endy Virgen MD  April 10, 2023  10:17 AM      Pt is a 68 year old female last seen on 3/2/23 here for follow up of:     L ankle fx - quit using the ankle brace 2 weeks ago  Walks 1.1 miles daily -> aches which go away after resting; up to 8-9,000 steps/day  Going to [place] next week -> walks a lot in [place] and wants to be able to do that      Per last PT note on 3/29/23:  Pt stopped wearing her brace most of the time. yesterday walked 7,500 total steps - did a longer walk outside 1 mile with the brace on, but not using it in the house. The brace is causing more pain than it is helping with. Today there is no pain, but some fatigue.    Per my last note:  (L01.050D) Displaced fracture of distal end of fibula with routine healing  (primary encounter diagnosis)  Comment: stable, healing fx; clinically no tenderness at fracture site and no pain w/ weight-bearing; recommended she wear boot x 1 additional week and wean to a stirrup ankle brace; start PT next week; f/u in 4 wks; precautions given  Plan: Physical Therapy Referral    Past Medical History:   Diagnosis Date     History of actinic keratoses 11/14/2022    Previously treated with Efudex     Insomnia 7/27/2010     Prediabetes 8/12/2022    A1c 5.8 in August 2022     Sleep apnea 4/27/1997    Wears mandibular appliance      Current Outpatient Medications   Medication Sig Dispense Refill     COMPOUNDED NON-CONTROLLED  SUBSTANCE (CMPD RX) - PHARMACY TO MIX COMPOUNDED MEDICATION Estriol/Testosterone/Hyaluronic acid 0.5/0.5/12 mg/gram: Insert 1 gram into vagina twice weekly or up to 4x/weekly as needed 50 g 3     conjugated estrogens (PREMARIN) 0.625 MG/GM vaginal cream Place 1 g vaginally twice a week 30 g 2     EPINEPHrine (ANY BX GENERIC EQUIV) 0.3 MG/0.3ML injection 2-pack Inject 0.3 mLs (0.3 mg) into the muscle as needed for anaphylaxis 2 each 3     traZODone (DESYREL) 50 MG tablet Take 1 tablet (50 mg) by mouth At Bedtime 90 tablet 3     zolpidem (AMBIEN) 5 MG tablet Take 0.5 tablets (2.5 mg) by mouth nightly as needed for sleep 30 tablet 1      Allergies   Allergen Reactions     Bees      Cats      Oxycodone       ROS:   Gen- no fevers/chills   Rheum - no morning stiffness   Derm - no rash/ redness   Neuro - no numbness, no tingling   Remainder of ROS negative.     Exam:     L ankle:   Full ROM  Strength 5/5  No TTP at fx site  Able to single leg stand w/o pain    Xray of L ankle on April 10, 2023 at [facility] [place] location - films personally reviewed with patient at time of visit     My impression: stable/ healing distal fibula fx         Again, thank you for allowing me to participate in the care of your patient.      Sincerely,    Endy Virgen MD

## 2023-04-22 NOTE — ADDENDUM NOTE
Encounter addended by: Yvonne Molina, PT on: 4/21/2023 8:15 PM   Actions taken: Episode resolved, Clinical Note Signed

## 2023-04-22 NOTE — PROGRESS NOTES
[facility]    Outpatient Physical Therapy Discharge Note  Patient: Tammy Dennis  : 1955    Beginning/End Dates of Reporting Period:  3/9/23 to 3/29/23    Referring Provider: Endy Virgen MD     Therapy Diagnosis: Left Ankle Pain     Client Self Report: Pt stopped wearing her brace most of the time. yesterday walked 7,500 total steps - did a longer walk outside 1 mile with the brace on, but not using it in the house. The brace is causing more pain than it is helping with. Today there is no pain, but some fatigue.    Objective Measurements:  See last treatment session     Outcome Measures (most recent score):  See initial evaluation     Goals:  See last treatment session     Plan:  Discharge from therapy.    Discharge:    Reason for Discharge: Pt doing well and cancelled last appointment     Equipment Issued: N/A    Discharge Plan: Patient to continue home program.    Yvonne Molina, PT, DPT, CLT  2023

## 2023-04-28 ENCOUNTER — TRANSFERRED RECORDS (OUTPATIENT)
Dept: HEALTH INFORMATION MANAGEMENT | Facility: CLINIC | Age: 68
End: 2023-04-28

## 2023-06-19 NOTE — RESULT ENCOUNTER NOTE
Normal result to MA. Please notify.  Reviewed at time of visit.    Endy Virgen MD  4/10/2023  2:15 PM

## 2023-08-12 ASSESSMENT — ENCOUNTER SYMPTOMS
EYE PAIN: 0
COUGH: 0
SORE THROAT: 0
ABDOMINAL PAIN: 0
COUGH: 0
FEVER: 0
BREAST MASS: 0
HEARTBURN: 0
DIZZINESS: 0
NAUSEA: 0
HEARTBURN: 0
SORE THROAT: 0
HEMATURIA: 0
WEAKNESS: 0
CHILLS: 0
CONSTIPATION: 1
NERVOUS/ANXIOUS: 0
FEVER: 0
MYALGIAS: 0
HEMATURIA: 0
BREAST MASS: 0
DIARRHEA: 0
ABDOMINAL PAIN: 0
MYALGIAS: 0
HEMATOCHEZIA: 0
SHORTNESS OF BREATH: 0
ARTHRALGIAS: 0
CONSTIPATION: 1
HEADACHES: 0
CHILLS: 0
PARESTHESIAS: 0
JOINT SWELLING: 0
NAUSEA: 0
ARTHRALGIAS: 0
DYSURIA: 0
HEADACHES: 0
WEAKNESS: 0
FREQUENCY: 1
JOINT SWELLING: 0
DYSURIA: 0
SHORTNESS OF BREATH: 0
PALPITATIONS: 0
EYE PAIN: 0
NERVOUS/ANXIOUS: 0
DIZZINESS: 0
FREQUENCY: 1
DIARRHEA: 0
PARESTHESIAS: 0
PALPITATIONS: 0
HEMATOCHEZIA: 0

## 2023-08-12 ASSESSMENT — ACTIVITIES OF DAILY LIVING (ADL)
CURRENT_FUNCTION: NO ASSISTANCE NEEDED
CURRENT_FUNCTION: NO ASSISTANCE NEEDED

## 2023-08-14 ENCOUNTER — MYC MEDICAL ADVICE (OUTPATIENT)
Dept: FAMILY MEDICINE | Facility: CLINIC | Age: 68
End: 2023-08-14
Payer: MEDICARE

## 2023-08-15 ENCOUNTER — OFFICE VISIT (OUTPATIENT)
Dept: FAMILY MEDICINE | Facility: CLINIC | Age: 68
End: 2023-08-15
Payer: MEDICARE

## 2023-08-15 VITALS
HEIGHT: 64 IN | WEIGHT: 153 LBS | BODY MASS INDEX: 26.12 KG/M2 | HEART RATE: 64 BPM | OXYGEN SATURATION: 99 % | SYSTOLIC BLOOD PRESSURE: 108 MMHG | DIASTOLIC BLOOD PRESSURE: 71 MMHG

## 2023-08-15 DIAGNOSIS — Z12.31 ENCOUNTER FOR SCREENING MAMMOGRAM FOR BREAST CANCER: ICD-10-CM

## 2023-08-15 DIAGNOSIS — Z91.030 ALLERGY TO HONEY BEE VENOM: ICD-10-CM

## 2023-08-15 DIAGNOSIS — N39.3 FEMALE STRESS INCONTINENCE: ICD-10-CM

## 2023-08-15 DIAGNOSIS — R73.03 PREDIABETES: ICD-10-CM

## 2023-08-15 DIAGNOSIS — K59.00 CONSTIPATION, UNSPECIFIED CONSTIPATION TYPE: ICD-10-CM

## 2023-08-15 DIAGNOSIS — Z00.00 MEDICARE ANNUAL WELLNESS VISIT, SUBSEQUENT: Primary | ICD-10-CM

## 2023-08-15 DIAGNOSIS — N95.1 VAGINAL DRYNESS, MENOPAUSAL: ICD-10-CM

## 2023-08-15 DIAGNOSIS — H91.93 DECREASED HEARING OF BOTH EARS: ICD-10-CM

## 2023-08-15 DIAGNOSIS — G47.00 INSOMNIA, UNSPECIFIED TYPE: ICD-10-CM

## 2023-08-15 DIAGNOSIS — G47.33 OBSTRUCTIVE SLEEP APNEA SYNDROME: ICD-10-CM

## 2023-08-15 DIAGNOSIS — Z78.0 POSTMENOPAUSAL STATUS: ICD-10-CM

## 2023-08-15 DIAGNOSIS — Z23 NEED FOR PNEUMOCOCCAL VACCINE: ICD-10-CM

## 2023-08-15 DIAGNOSIS — Z00.00 WELLNESS EXAMINATION: Primary | ICD-10-CM

## 2023-08-15 PROBLEM — M19.071 OSTEOARTHRITIS OF RIGHT FOOT: Status: ACTIVE | Noted: 2017-02-07

## 2023-08-15 LAB — HBA1C MFR BLD: 5.7 % (ref 0–5.6)

## 2023-08-15 PROCEDURE — 83036 HEMOGLOBIN GLYCOSYLATED A1C: CPT | Performed by: FAMILY MEDICINE

## 2023-08-15 PROCEDURE — 90677 PCV20 VACCINE IM: CPT | Performed by: FAMILY MEDICINE

## 2023-08-15 PROCEDURE — 36415 COLL VENOUS BLD VENIPUNCTURE: CPT | Performed by: FAMILY MEDICINE

## 2023-08-15 PROCEDURE — 99214 OFFICE O/P EST MOD 30 MIN: CPT | Mod: 25 | Performed by: FAMILY MEDICINE

## 2023-08-15 PROCEDURE — G0009 ADMIN PNEUMOCOCCAL VACCINE: HCPCS | Performed by: FAMILY MEDICINE

## 2023-08-15 PROCEDURE — 99207 PR NO BILLABLE SERVICE THIS VISIT: CPT

## 2023-08-15 PROCEDURE — G0439 PPPS, SUBSEQ VISIT: HCPCS | Performed by: FAMILY MEDICINE

## 2023-08-15 RX ORDER — ZOLPIDEM TARTRATE 5 MG/1
2.5 TABLET ORAL
Qty: 30 TABLET | Refills: 1 | Status: SHIPPED | OUTPATIENT
Start: 2023-08-15 | End: 2024-04-22

## 2023-08-15 RX ORDER — EPINEPHRINE 0.3 MG/.3ML
0.3 INJECTION SUBCUTANEOUS PRN
Qty: 2 EACH | Refills: 3 | Status: CANCELLED | OUTPATIENT
Start: 2023-08-15

## 2023-08-15 RX ORDER — EPINEPHRINE 0.3 MG/.3ML
0.3 INJECTION SUBCUTANEOUS PRN
Qty: 2 EACH | Refills: 3 | Status: SHIPPED | OUTPATIENT
Start: 2023-08-15

## 2023-08-15 RX ORDER — TRAZODONE HYDROCHLORIDE 50 MG/1
50 TABLET, FILM COATED ORAL AT BEDTIME
Qty: 90 TABLET | Refills: 3 | Status: SHIPPED | OUTPATIENT
Start: 2023-08-15 | End: 2024-08-16

## 2023-08-15 ASSESSMENT — ENCOUNTER SYMPTOMS
CHILLS: 0
MYALGIAS: 0
DIZZINESS: 0
NAUSEA: 0
NERVOUS/ANXIOUS: 0
HEADACHES: 0
HEARTBURN: 0
HEMATURIA: 0
BREAST MASS: 0
JOINT SWELLING: 0
DYSURIA: 0
SORE THROAT: 0
PARESTHESIAS: 0
CONSTIPATION: 1
FEVER: 0
ARTHRALGIAS: 0
DIARRHEA: 0
SHORTNESS OF BREATH: 0
ABDOMINAL PAIN: 0
WEAKNESS: 0
HEMATOCHEZIA: 0
FREQUENCY: 1
COUGH: 0
EYE PAIN: 0
PALPITATIONS: 0

## 2023-08-15 ASSESSMENT — ACTIVITIES OF DAILY LIVING (ADL): CURRENT_FUNCTION: NO ASSISTANCE NEEDED

## 2023-08-15 NOTE — PROGRESS NOTES
"SUBJECTIVE:   Tammy is a 68 year old who presents for Preventive Visit.    Are you in the first 12 months of your Medicare coverage?  No    Healthy Habits:     In general, how would you rate your overall health?  Good    Frequency of exercise:  4-5 days/week    Duration of exercise:  15-30 minutes    Do you usually eat at least 4 servings of fruit and vegetables a day, include whole grains    & fiber and avoid regularly eating high fat or \"junk\" foods?  Yes    Taking medications regularly:  Yes    Medication side effects:  None    Ability to successfully perform activities of daily living:  No assistance needed    Home Safety:  Lack of grab bars in the bathroom    Hearing Impairment:  Difficulty following a conversation in a noisy restaurant or crowded room and difficulty understanding soft or whispered speech    In the past 6 months, have you been bothered by leaking of urine? Yes    In general, how would you rate your overall mental or emotional health?  Fair    Additional concerns today:  Yes    Has been working on eating more healthy, but has gained about 10 lb since she got to [place] a year ago. She hasn't been exercising as much around the time of the ankle fracture, but is also struggling with motivation. Would like to start a medication to help improve her prediabetes and to promote weight loss.     Have you ever done Advance Care Planning? (For example, a Health Directive, POLST, or a discussion with a medical provider or your loved ones about your wishes): Yes, patient states has an Advance Care Planning document and will bring a copy to the clinic.    Fall risk  Fallen 2 or more times in the past year?: No  Any fall with injury in the past year?: Yes (left ankle fracture 1/2023)  Timed Up and Go Test (>13.5 is fall risk; contact physician) : 9    Cognitive Screening   1) Repeat 3 items (Leader, Season, Table)    2) Clock draw: NORMAL  3) 3 item recall: Recalls 3 objects  Results: 3 items recalled: COGNITIVE " IMPAIRMENT LESS LIKELY    Mini-CogTM Copyright JOHNNY Ogluin. Licensed by the author for use in [facility]; reprinted with permission (jesus alberto@.[facility]). All rights reserved.      Do you have sleep apnea, excessive snoring or daytime drowsiness? : yes - recently started CPAP in June 2023    Reviewed and updated as needed this visit by clinical staff   Tobacco  Allergies  Meds   Med Hx  Surg Hx  Fam Hx          Reviewed and updated as needed this visit by Provider   Tobacco   Meds   Med Hx  Surg Hx  Fam Hx         Social History     Tobacco Use    Smoking status: Never    Smokeless tobacco: Never   Substance Use Topics    Alcohol use: Not Currently           8/12/2023     8:53 PM   Alcohol Use   Prescreen: >3 drinks/day or >7 drinks/week? No    No     Do you have a current opioid prescription? No  Do you use any other controlled substances or medications that are not prescribed by a provider? None      Current providers sharing in care for this patient include:   Patient Care Team:  Josselyn Ordonez MD as PCP - General (Family Medicine)  Ghassan Whatley MD as MD (Dermatology)  Ghassan Whatley MD as Assigned Surgical Provider  Ruth Hutchins MD as MD (Dermatology)  Josselyn Ordonez MD as Assigned PCP    The following health maintenance items are reviewed in Epic and correct as of today:  Health Maintenance   Topic Date Due    COVID-19 Vaccine (6 - Pfizer series) 01/13/2023    MEDICARE ANNUAL WELLNESS VISIT  08/12/2023    ZOSTER IMMUNIZATION (2 of 2) 08/16/2023    INFLUENZA VACCINE (1) 09/01/2023    FALL RISK ASSESSMENT  08/15/2024    MAMMO SCREENING  11/28/2024    COLORECTAL CANCER SCREENING  06/20/2026    LIPID  02/23/2027    ADVANCE CARE PLANNING  08/15/2028    DTAP/TDAP/TD IMMUNIZATION (2 - Td or Tdap) 07/06/2030    DEXA  08/01/2036    HEPATITIS C SCREENING  Completed    PHQ-2 (once per calendar year)  Completed    Pneumococcal Vaccine: 65+ Years  Completed    IPV  "IMMUNIZATION  Aged Out    MENINGITIS IMMUNIZATION  Aged Out     FHS-7:       11/28/2022     9:50 AM   Breast CA Risk Assessment (FHS-7)   Did any of your first-degree relatives have breast or ovarian cancer? No   Did any of your relatives have bilateral breast cancer? No   Did any man in your family have breast cancer? No   Did any woman in your family have breast and ovarian cancer? No   Did any woman in your family have breast cancer before age 50 y? No   Do you have 2 or more relatives with breast and/or ovarian cancer? No   Do you have 2 or more relatives with breast and/or bowel cancer? No     Pertinent mammograms are reviewed under the imaging tab.    Pap smears up to date. Last done in 7/2020 at age 65 yr. No HPV data available. Due now.    Review of Systems   Constitutional:  Negative for chills and fever.   HENT:  Positive for hearing loss. Negative for congestion, ear pain and sore throat.    Eyes:  Negative for pain and visual disturbance.   Respiratory:  Negative for cough and shortness of breath.    Cardiovascular:  Positive for peripheral edema. Negative for chest pain and palpitations.   Gastrointestinal:  Positive for constipation. Negative for abdominal pain, diarrhea, heartburn, hematochezia and nausea.   Breasts:  Negative for tenderness, breast mass and discharge.   Genitourinary:  Positive for frequency and urgency. Negative for dysuria, genital sores, hematuria, pelvic pain, vaginal bleeding and vaginal discharge.   Musculoskeletal:  Negative for arthralgias, joint swelling and myalgias.   Skin:  Negative for rash.   Neurological:  Negative for dizziness, weakness, headaches and paresthesias.   Psychiatric/Behavioral:  Negative for mood changes. The patient is not nervous/anxious.      OBJECTIVE:   /71 (BP Location: Right arm, Patient Position: Sitting, Cuff Size: Adult Regular)   Pulse 64   Ht 1.626 m (5' 4\")   Wt 69.4 kg (153 lb)   SpO2 99%   BMI 26.26 kg/m   Estimated body mass " "index is 26.26 kg/m  as calculated from the following:    Height as of this encounter: 1.626 m (5' 4\").    Weight as of this encounter: 69.4 kg (153 lb).  Physical Exam  GENERAL: healthy, alert and no distress  RESP: normal rate and effort  MS: no gross musculoskeletal defects noted, no edema  PSYCH: mentation appears normal, affect normal/bright    ASSESSMENT / PLAN:   (Z00.00) Medicare annual wellness visit, subsequent  (primary encounter diagnosis)  Comment: Here today for routine preventive care. Doing well overall. Due for mammogram & cervical cancer screening (identified on close chart review after the visit). Will need a return visit to complete the pap smear. Vaccines updated as appropriate.  Plan:   - Orders/issues as below.  - Needs return visit for final pap smear     (R73.03) Prediabetes  Comment: A1c last year of 5.8. Repeating today.  Plan: Hemoglobin A1c  Begin metFORMIN (GLUCOPHAGE) 850 MG         Tablet for prevention of progression to DM and to promote weight loss  Ongoing healthy diet and exercise          (Z23) Need for pneumococcal vaccine  Comment: updated today  Plan: PNEUMOCOCCAL 20 VALENT CONJUGATE (PREVNAR 20)          (Z12.31) Encounter for screening mammogram for breast cancer  Comment: Routine annual screening due later in 2023.  Plan: MA SCREENING DIGITAL BILAT    (Z78.0) Postmenopausal status  Comment: History of osteopenia in left femur/thigh (T score -1.7) and lumbar spine (T score -1.2) in 2021. On calcium and vitamin D but not sure of dosing.    Plan: Repeat Dexa hip/pelvis/spine*        Continue vitamin D and calcium supplements.    (N95.1) Vaginal dryness, menopausal  Comment: Symptomatic relief with topical estrogen, but finding this prohibitively expensive with her Medicare supplemental coverage. MTM consult to explore options.  Plan: conjugated estrogens (PREMARIN) 0.625 MG/GM         vaginal cream, Med Therapy Management Referral    (N39.3) Female stress " incontinence  Comment: Largely stress with some urge incontinence per history.   Plan: Physical Therapy Referral for pelvic floor PT    (H91.93) Decreased hearing of both ears  Comment: Decreased hearing in loud rooms. Suspect presbycusis.  Plan: Adult Audiology  Referral    (G47.00) Insomnia, unspecified type  Comment: Some improvement with CPAP treatment for UNRULY. Still using sleep aids as below. Counseled on risks of ongoing Ambien use with UNRULY diagnosis and urged use only for more significant sleep disruptions.  Plan: zolpidem (AMBIEN) 5 MG tablet, traZODone         (DESYREL) 50 MG tablet    (G47.33) Obstructive sleep apnea syndrome  Comment: Began CPAP in June 2023. Will have follow up shortly. Working on increasing consistency of use.   Plan: Continue CPAP. Follow up with sleep medicine as planned. May desire a referral to a new sleep med specialist within [facility].    (Z91.030) Allergy to honey bee venom  Comment: Requests paper prescription for use with [employer] card to reduce out of pocket costs.  Plan: EPINEPHrine (ANY BX GENERIC EQUIV) 0.3 MG/0.3ML        injection 2-pack, Med Therapy Management         Referral    (K59.00) Constipation, unspecified constipation type  Comment: Longstanding constipation now with symptoms of internal and external hemorrhoids. No pain. Mostly itching and some blood with wiping.    Plan: Physical Therapy Referral for pelvic floor PT  Continue fiber supplementation  Add in magnesium citrate powder (CALM)    Patient has been advised of split billing requirements and indicates understanding: Yes        COUNSELING:  Reviewed preventive health counseling, as reflected in patient instructions       Regular exercise       Healthy diet/nutrition       Hearing screening       Bladder control       Osteoporosis prevention/bone health       Colon cancer screening       (Lynda)menopause management      BMI:   Estimated body mass index is 26.26 kg/m  as calculated from the following:     "Height as of this encounter: 1.626 m (5' 4\").    Weight as of this encounter: 69.4 kg (153 lb).       She reports that she has never smoked. She has never used smokeless tobacco.      Appropriate preventive services were discussed with this patient, including applicable screening as appropriate for cardiovascular disease, diabetes, osteopenia/osteoporosis, and glaucoma.  As appropriate for age/gender, discussed screening for colorectal cancer, prostate cancer, breast cancer, and cervical cancer. Checklist reviewing preventive services available has been given to the patient.    Reviewed patients plan of care and provided an AVS. The Basic Care Plan (routine screening as documented in Health Maintenance) for Tammy meets the Care Plan requirement. This Care Plan has been established and reviewed with the Patient.          Josselyn Ordonez MD  [facility]    Identified Health Risks:  I have reviewed Opioid Use Disorder and Substance Use Disorder risk factors and made any needed referrals.   "

## 2023-08-15 NOTE — PROGRESS NOTES
"SUBJECTIVE:   Tammy is a 68 year old who presents for Preventive Visit.  {(!) Visit Details have not yet been documented.  Please enter Visit Details and then use this list to pull in documentation. (Optional):203161}    Are you in the first 12 months of your Medicare coverage?  { :811209}    Healthy Habits:     In general, how would you rate your overall health?  Good    Frequency of exercise:  4-5 days/week    Duration of exercise:  15-30 minutes    Do you usually eat at least 4 servings of fruit and vegetables a day, include whole grains    & fiber and avoid regularly eating high fat or \"junk\" foods?  Yes    Taking medications regularly:  Yes    Medication side effects:  None    Ability to successfully perform activities of daily living:  No assistance needed    Home Safety:  Lack of grab bars in the bathroom    Hearing Impairment:  Difficulty following a conversation in a noisy restaurant or crowded room and difficulty understanding soft or whispered speech    In the past 6 months, have you been bothered by leaking of urine? Yes    In general, how would you rate your overall mental or emotional health?  Fair    Additional concerns today:  Yes        Have you ever done Advance Care Planning? (For example, a Health Directive, POLST, or a discussion with a medical provider or your loved ones about your wishes): { :155580}    {Hearing Test Done (Optional):283353}   Fall risk  { :694477}  {If any of the above assessments are answered yes, consider ordering appropriate referrals (Optional):197408}  Cognitive Screening { :528154}    {Do you have sleep apnea, excessive snoring or daytime drowsiness? (Optional):576277}    Reviewed and updated as needed this visit by clinical staff    Allergies               Reviewed and updated as needed this visit by Provider                 Social History     Tobacco Use    Smoking status: Never    Smokeless tobacco: Never   Substance Use Topics    Alcohol use: Not Currently "     {Rooming staff  Click this link to complete the Prescreen if response below is not for today's visit  Alcohol Use Prescreen >3 drinks/day or > 7 drinks/week.  If the prescreen question answer is YES, complete the full AUDIT  :079943}        8/12/2023     8:53 PM   Alcohol Use   Prescreen: >3 drinks/day or >7 drinks/week? No    No   {add AUDIT responses (Optional) (A score of 7 for adult men is an indication of hazardous drinking; a score of 8 or more is an indication of an alcohol use disorder.  A score of 7 or more for adult women is an indication of hazardous drinking or an alchohol use disorder):442382}  Do you have a current opioid prescription? { :875439}  Do you use any other controlled substances or medications that are not prescribed by a provider? {Substance Use :614955}  {Provider  If there are gaps in the social history shown above, please follow the link and refresh the note Link to Social and Substance History :637935}    {Outside tests to abstract? :092130}    {additional problems to add (Optional):673575}    Current providers sharing in care for this patient include: {Rooming staff:  Please update Care Team from storyboard, refresh this note and then delete this statement}  Patient Care Team:  Josselyn Ordonez MD as PCP - General (Family Medicine)  Ghassan Whatley MD as MD (Dermatology)  Ghassan Whatley MD as Assigned Surgical Provider  Ruth Hutchins MD as MD (Dermatology)  Josselyn Ordonez MD as Assigned PCP    The following health maintenance items are reviewed in Epic and correct as of today:  Health Maintenance   Topic Date Due    Pneumococcal Vaccine: 65+ Years (1 - PCV) Never done    COVID-19 Vaccine (6 - Pfizer series) 01/13/2023    MEDICARE ANNUAL WELLNESS VISIT  08/12/2023    ZOSTER IMMUNIZATION (2 of 2) 08/16/2023    INFLUENZA VACCINE (1) 09/01/2023    FALL RISK ASSESSMENT  08/15/2024    MAMMO SCREENING  11/28/2024    COLORECTAL CANCER SCREENING   06/20/2026    LIPID  02/23/2027    ADVANCE CARE PLANNING  08/23/2027    DTAP/TDAP/TD IMMUNIZATION (2 - Td or Tdap) 07/06/2030    DEXA  08/01/2036    HEPATITIS C SCREENING  Completed    PHQ-2 (once per calendar year)  Completed    IPV IMMUNIZATION  Aged Out    MENINGITIS IMMUNIZATION  Aged Out     {Chronicprobdata (optional):118922}  {Decision Support (Optional):960156}    FHS-7:       11/28/2022     9:50 AM   Breast CA Risk Assessment (FHS-7)   Did any of your first-degree relatives have breast or ovarian cancer? No   Did any of your relatives have bilateral breast cancer? No   Did any man in your family have breast cancer? No   Did any woman in your family have breast and ovarian cancer? No   Did any woman in your family have breast cancer before age 50 y? No   Do you have 2 or more relatives with breast and/or ovarian cancer? No   Do you have 2 or more relatives with breast and/or bowel cancer? No     {If any of the questions to the BCRA (FHS-7) are answered yes, consider ordering referral for genetic counseling (Optional) :291973}  {AMB Mammogram Decision Support (Optional) :765552}  Pertinent mammograms are reviewed under the imaging tab.    Review of Systems   Constitutional:  Negative for chills and fever.   HENT:  Positive for hearing loss. Negative for congestion, ear pain and sore throat.    Eyes:  Negative for pain and visual disturbance.   Respiratory:  Negative for cough and shortness of breath.    Cardiovascular:  Positive for peripheral edema. Negative for chest pain and palpitations.   Gastrointestinal:  Positive for constipation. Negative for abdominal pain, diarrhea, heartburn, hematochezia and nausea.   Breasts:  Negative for tenderness, breast mass and discharge.   Genitourinary:  Positive for frequency and urgency. Negative for dysuria, genital sores, hematuria, pelvic pain, vaginal bleeding and vaginal discharge.   Musculoskeletal:  Negative for arthralgias, joint swelling and myalgias.   Skin:   "Negative for rash.   Neurological:  Negative for dizziness, weakness, headaches and paresthesias.   Psychiatric/Behavioral:  Negative for mood changes. The patient is not nervous/anxious.      {ROS COMP (Optional):285850}    OBJECTIVE:   /71 (BP Location: Right arm, Patient Position: Sitting, Cuff Size: Adult Regular)   Pulse 64   Ht 1.626 m (5' 4\")   Wt 69.4 kg (153 lb)   SpO2 99%   BMI 26.26 kg/m   Estimated body mass index is 26.26 kg/m  as calculated from the following:    Height as of this encounter: 1.626 m (5' 4\").    Weight as of this encounter: 69.4 kg (153 lb).  Physical Exam  {Exam (Optional) :547632}    {Diagnostic Test Results (Optional):079779}    ASSESSMENT / PLAN:   {Diag Picklist:825976}    {Patient advised of split billing (Optional):348061}      COUNSELING:  {Medicare Counselin}      BMI:   Estimated body mass index is 26.26 kg/m  as calculated from the following:    Height as of this encounter: 1.626 m (5' 4\").    Weight as of this encounter: 69.4 kg (153 lb).   {Weight Management Plan needed for ACO:878112}      She reports that she has never smoked. She has never used smokeless tobacco.      Appropriate preventive services were discussed with this patient, including applicable screening as appropriate for cardiovascular disease, diabetes, osteopenia/osteoporosis, and glaucoma.  As appropriate for age/gender, discussed screening for colorectal cancer, prostate cancer, breast cancer, and cervical cancer. Checklist reviewing preventive services available has been given to the patient.    Reviewed patients plan of care and provided an AVS. The {CarePlan:511152} for Tammy meets the Care Plan requirement. This Care Plan has been established and reviewed with the {PATIENT, FAMILY MEMBER, CAREGIVER:068624}.    {Counseling Resources  [place] Preventive Services Task Force  Cholesterol Screening  Health diet/nutrition  Pooled Cohorts Equation Calculator  [employer]'s MyPlate  ASA " Prophylaxis  Lung CA Screening  Osteoporosis prevention/bone health :050768}  {Breast Cancer Risk Calculator  BRCA-Related Cancer Risk Assessment FHS-7 Tool :876255}    Josselyn Ordonez MD  [facility]    Identified Health Risks:  {Medicare required documentation of substance and opioid use disorders screening :757314}

## 2023-08-15 NOTE — PATIENT INSTRUCTIONS
- We did your pneumonia shot today. Return to your pharmacy this fall for your 2nd shot.   - Call the audiologist to set up an appointment.   - The pharmacist will reach out to discuss options of your prescriptions.   - Start the metformin at dinner once per day.   - Start the Calm powder.  - Make your appointments for your DEXA scan and mammogram.   - Set up your pelvic physical therapy appointment      PERSONAL PREVENTIVE SERVICES PLAN - SERVICES     Review these tests with your medical staff then decide which ones you want and take this page home for your reference      SCREENING TESTS     Description   Year of Last Screening   Recommended Today?   Heart disease screening blood tests  Cholesterol level Reducing cholesterol can reduce your risk of heart attacks by 25%.  Screening is recommended yearly if you are at risk of heart disease otherwise every 4-5 years 2/23/22 No: is not indicated today.   Diabetes screening tests  Hemoglobin A1c blood test   Finding and treating diabetes early can reduce complications.  Screening recommended/covered yearly if you have high blood pressure, high cholesterol, obesity (BMI >30), or a history of high blood glucose tests; or 2 of the following: family history of diabetes, overweight (BMI >25 but <30), age 65 years or older, and a history of diabetes of pregnancy or gave birth to baby weighing more than 9 lbs. 8/12/22  hgbA1c  5.8 No: is not indicated today.   Hepatitis B screening Finding hepatitis B early can reduce complications.  Screening is recommended for persons with selected risk factors.  No: is not indicated today.   Hepatitis C screening Finding hepatitis C early can reduce complications.  Screening is recommended for all persons born from 1945 through 1965 and for those with selected other risk factors.  8/12/22  neg No: is not indicated today.   HIV screening Finding HIV early can reduce complications.  Screening is recommended for persons with risk factors for  HIV infection.  No: is not indicated today.   Glaucoma screening Early detection of glaucoma can prevent blindness.   Please talk to your eye doctor about this.       SCREENING TESTS     Description   Year of Last Screening   Recommended Today?   Colorectal cancer screening  Fecal occult blood test   Screening colonoscopy Screening for colon cancer has been shown to reduce death from colon cancer by 25-30%. Screening recommended to start at 50 years and continuing until age 75 years.   2016  Hyperplastic polyps DUE 2026   Breast Cancer Screening (women)  Mammogram Mammogram screening for breast cancer has been shown to reduce the risk of dying from breast cancer and prolong life. Screening is recommended every 1-2 years for women aged 50 to 74 years.  11/28/22  neg No; is up to date.   Cervical Cancer screening (women)  Pap Cervical pap smears can reduce cervical cancer. Screening is recommended annually if high risk (history of abnormal pap smears) otherwise every 2-3 years, stop screening at 65 years of age if history of normal paps. 7/6/20  neg Yes; Recommended    Screening for Osteoporosis:  Bone mass measurements (women)  Dexa Scan Screening and treating Osteoporosis can reduce the risk of hip and spine fractures. Screening is recommended in women 65 years or older and in women and men at risk of osteoporosis. 11/23/21  osteopenia No; is up to date.   Screening for Lung Cancer   Low-dose CT scanning Screening can reduce mortality in persons aged 55-80 who have smoked at least 30 pack-years and who are either still smoking or have quit in the past 15 years.  No: is not indicated today.   Abdominal Aortic Aneurysm (AAA) screening  Ultrasound (US)   An aneurysm treated before rupture is very safe -a ruptured aneurysm can be fatal.  Screening  by US for AAA is limited to patients who meet one of the following criteria:  Men who are 65-75 years old and have smoked more than 100 cigarettes in their lifetime  Anyone  with a family history of abdominal aortic aneurysm  No: is not indicated today.     Here are your recommended immunizations.  Take this home for your reference.                                                    IMMUNIZATIONS Description Recommend today?     Influenza (Flu shot) Prevents flu; should get every year No: is not indicated today.   PCV 13 Pneumonia vaccination; you get it once Yes; Recommended    PPSV 23 Second pneumonia vaccination; usually get it 1 year after PCV 13 No: is not indicated today.   Zoster (Shingles) Prevents shingles; you get it once  (Check with Part D insurance for coverage, must receive at a pharmacy, not clinic) #2 due end of August to Sept 2023   Tetanus Prevents tetanus; once every 10 years No; is up to date.     Hepatitis B  If you have any of the following risk factors you should be immunized for hepatitis B: severe kidney disease, people who live in the same house as a carrier of Hepatitis B virus, people who live in  institutions (e.g. nursing homes or group homes), homosexual men, patients with hemophilia who received Factor VIII or IX concentrates, abusers of illicit injectable drugs No: is not indicated today.      PATIENT INSTRUCTIONS    Yearly exam:   See your health care provider every year in order to review changes in your health, review medicines that you take, and discuss preventive care needs such as immunizations and cancer screening.  Get a flu shot each year.     Advance Directives:  If you have not done so, you are encouraged to complete advance directives and/or a living will.   More information about advance directives can be found at: http://www.[place]med.org/advocacy/Key-Issues/Advance-Directives    Nutrition:   Eat at least 5 servings of fruits and vegetables each day.   Eat whole-grain bread, whole-wheat pasta and brown rice instead of white grains and rice.   Talk to your doctor about Calcium and Vitamin D.     Lifestyle:  Exercise for at least 150 minutes a  week (30 minutes a day, 5 days a week). This will help you control your weight and prevent disease.   Limit alcohol to one drink per day.   If you smoke, try to quit - your doctor will be happy to help.   Wear sunscreen to prevent skin cancer.   See your dentist every six months for an exam and cleaning.   See your eye doctor every 1 to 2 years to screen for conditions such as glaucoma, macular degeneration and cataracts.

## 2023-08-17 NOTE — TELEPHONE ENCOUNTER
Ok I called and spoke to patient and she is open to doing pap in her next appointment on 10/17/23. Will try to extend the appointment length.     Thanks,     LEIDY, CMA

## 2023-08-17 NOTE — TELEPHONE ENCOUNTER
----- Message from Josselyn Ordonez MD sent at 8/16/2023  1:19 PM CDT -----  On reviewing Tammy's Medicare Wellness documentation more closely today, I realized that her pap smear was done in 2020 prior to her turning 66 years. This means that she is due for another pap smear this year as her final cervical cancer screening example (as long as it is normal). Can you please call her to see if she would be willing to extend her visit in October to include a pap smear, and then increase the encounter time to 40 minutes. Thanks!    Josselyn Ordonez MD

## 2023-08-18 ENCOUNTER — TELEPHONE (OUTPATIENT)
Dept: FAMILY MEDICINE | Facility: CLINIC | Age: 68
End: 2023-08-18
Payer: MEDICARE

## 2023-08-18 ENCOUNTER — VIRTUAL VISIT (OUTPATIENT)
Dept: PHARMACY | Facility: CLINIC | Age: 68
End: 2023-08-18
Attending: FAMILY MEDICINE
Payer: COMMERCIAL

## 2023-08-18 DIAGNOSIS — N95.1 MENOPAUSAL SYNDROME (HOT FLASHES): ICD-10-CM

## 2023-08-18 DIAGNOSIS — G47.00 INSOMNIA, UNSPECIFIED TYPE: ICD-10-CM

## 2023-08-18 DIAGNOSIS — Z91.030 BEE ALLERGY STATUS: ICD-10-CM

## 2023-08-18 DIAGNOSIS — M85.80 OSTEOPENIA, UNSPECIFIED LOCATION: ICD-10-CM

## 2023-08-18 DIAGNOSIS — K59.00 CONSTIPATION, UNSPECIFIED CONSTIPATION TYPE: ICD-10-CM

## 2023-08-18 DIAGNOSIS — Z78.9 TAKES DIETARY SUPPLEMENTS: ICD-10-CM

## 2023-08-18 DIAGNOSIS — N95.1 VAGINAL DRYNESS, MENOPAUSAL: Primary | ICD-10-CM

## 2023-08-18 DIAGNOSIS — R73.03 PREDIABETES: Primary | ICD-10-CM

## 2023-08-18 DIAGNOSIS — N89.8 VAGINAL DRYNESS: ICD-10-CM

## 2023-08-18 PROCEDURE — 99207 PR NO CHARGE LOS: CPT

## 2023-08-18 RX ORDER — DOCUSATE SODIUM 100 MG/1
200 CAPSULE, LIQUID FILLED ORAL DAILY
COMMUNITY

## 2023-08-18 RX ORDER — CHOLECALCIFEROL (VITAMIN D3) 50 MCG
1 TABLET ORAL DAILY
COMMUNITY

## 2023-08-18 RX ORDER — LANOLIN ALCOHOL/MO/W.PET/CERES
1000 CREAM (GRAM) TOPICAL DAILY
COMMUNITY

## 2023-08-18 RX ORDER — ESTRADIOL 0.1 MG/G
2 CREAM VAGINAL
Qty: 42.5 G | Refills: 4 | Status: SHIPPED | OUTPATIENT
Start: 2023-08-21 | End: 2024-09-09

## 2023-08-18 RX ORDER — ASCORBIC ACID 250 MG
250 TABLET,CHEWABLE ORAL DAILY
COMMUNITY

## 2023-08-18 RX ORDER — PSYLLIUM HUSK/CALCIUM CARB 1 G-60 MG
2 CAPSULE ORAL DAILY
COMMUNITY

## 2023-08-18 RX ORDER — PHENOL 1.4 %
10 AEROSOL, SPRAY (ML) MUCOUS MEMBRANE AT BEDTIME
COMMUNITY

## 2023-08-18 NOTE — Clinical Note
Hi Dr. Ordonez,   I had the pleasure of meeting with Tammy, I think the most affordable option would be trying the Estrace or generic vaginal estradiol cream. Would you be able to write a paper RX for the patient to  at the clinic so they would be able to see the price at different pharmacies if you would agree with this plan.   Thanks,  Laura Maier, PharmD Medication Therapy Management Pharmacist  [facility] and [facility]

## 2023-08-18 NOTE — TELEPHONE ENCOUNTER
Can we please call Tammy to inform her that we have a printed prescription of her topical estrogen therapy available at the  for  when it is convenient.     Thanks!    Josselyn Ordonez MD

## 2023-08-18 NOTE — PROGRESS NOTES
Medication Therapy Management (MTM) Encounter    ASSESSMENT:                            Medication Adherence/Access: See below for considerations    Pre-diabets: Stable, educated on common side effects of metformin and to take with meals to avoid GI related side effects.     Insomnia: Educated that long term use of Unisom can lead to potential memory impairment and increase risk of falls. Recommend patient stop taking Unisom and try CALM magnesium instead as this may help with sleep, bowels, and bones.     Vaginal dryness/Hot Flashes: Most affordable option for vaginal estrogen would likely be generic Estrace (estradiol) vaginal cream. Plan to discuss with Dr. Ordonez. Since patient is not having hot flashes patient could consider stopping black cohosh.     Bee Allergy: Stable, able to afford Epipen for this year through [employer].     Constipation: Recommend patient start CALM magnesium supplement as recommended by PCP. Would recommend checking magnesium levels after a couple weeks of taking.     Osteopenia: Patient is likely meeting goal calcium intake of 1,000- 1,200mg through diet and supplementation. Is also meeting goal vitamin D intake. Plan to obtain updated DEXA scan in November/December.     Supplements: Stable. Educated on watching the serving size on supplements to ensure that they are taking the correct doses.     PLAN:                            Recommend starting CALM magnesium supplement taking once daily before bed, as discussed this can help with sleep, constipation, and absorbing calcium    Stop taking Unisom     I will message Dr. Ordonez about estradiol cream and getting a paper prescription.     Follow-up: After discussing with Dr. Ordonez    SUBJECTIVE/OBJECTIVE:                          Tammy Dennis is a 68 year old female called for an initial visit. She was referred to me from Dr. Ordonez.      Reason for visit: Medication costs.    Allergies/ADRs: Reviewed in chart  Past Medical History:  Reviewed in chart  Tobacco: She reports that she has never smoked. She has never used smokeless tobacco.  Alcohol: 1-3 beverages / week  Caffeine: 1-2 beverages of mountain dew daily    Medication Adherence/Access: Patient takes medications directly from bottles.  Patient takes medications 2 time(s) per day. Vitamins usually in the morning and all prescription medications usually at night.   Per patient, misses medication 0 times per week.   Medication barriers: Cost of some medications especially brand name medications, specifically Premarin and Epipen.     Pre-diabets:    Metformin 850mg daily- patient just started taking this last night and has only taken one dose so far. Patient denies side effects thus far.   Lab Results   Component Value Date    A1C 5.7 (H) 08/15/2023     Insomnia:   Trazodone 50mg at bedtime  Zolpidem 2.5mg as needed. Patient notes that they do not take this every night, only for emergencies when she can not get to sleep, usually when she is traveling or in a new environment.   Unisom 25mg at bedtime   Melatonin 10mg at bedtime   Does get up several times during the night to go to the bathroom and has sleep apnea.     Vaginal dryness/Hot Flashes:   Currently is using a estrogen compounded cream that she is using 1g twice weekly which has been working well.   She notes that when she runs out of this prescription she wants to explore other options that may be more affordable. Notes that compounded tubes are over $100. Has about 1 more tube left currently which will last her a couple of weeks.   Patient was prescribed Premarin but this was not affordable.  Black cohosh 540mg daily- patient was having hot flashes previously when she started taking this supplement but now does not have symptoms. Wondering if she needs to continue to take this since there are times when she ran out of the medication for a longer period of time and symptoms did not return.      Bee Allergy:   Patient was having  troubles with affording Epipen, through insurance the copay was about $400, she now was able to get this to $100 through [employer] and able to fill the Epipen for the year.     Constipation:  Docusate 200mg daily at night.   Metamucil 2 capsules daily   Notes that overall this combination keeps her bowels regular.   She was recommended by PCP to try calm with magnesium but patient has not started this yet.   No results found for: MAG    Osteopenia:   Vitamin D 2000 units daily   Calcium 500mg daily/ vitamin D 1000 units daily   DEXA History: Per PCP note:   History of osteopenia in left femur/thigh (T score -1.7) and lumbar spine (T score -1.2) in 2021. On calcium and vitamin D but not sure of dosing.     Patient is getting  yogurt every morning, some cheese throughout the day, and spinach in salads daily .  Risk factors: post-menopausal  Last vitamin D level:  No results found for: VITDT    Supplements:   Vitamin C 250mg daily   Vitamin b12 1000mcg daily   Denies side effects.     Today's Vitals: There were no vitals taken for this visit.  ----------------  I spent 43 minutes with this patient today. I offer these suggestions for consideration by Dr. Ordonez. A copy of the visit note was provided to the patient's provider(s).    A summary of these recommendations was sent via [employer].    Laura Maier, PharmD  Medication Therapy Management Pharmacist   [facility]     Telemedicine Visit Details  Type of service:  Telephone visit  Start Time:  9:31 AM  End Time: 10:14 AM     Medication Therapy Recommendations  Insomnia    Rationale: Unsafe medication for the patient - Adverse medication event - Safety   Recommendation: Change Medication - Change unisom to CALM magnesium supplement   Status: Patient Agreed - Adherence/Education         Vaginal dryness    Current Medication: conjugated estrogens (PREMARIN) 0.625 MG/GM vaginal cream   Rationale: Cannot afford medication product -  Cost - Adherence   Recommendation: Change Medication - estradiol 0.1 MG/GM vaginal cream   Status: Accepted per Provider

## 2023-08-18 NOTE — PATIENT INSTRUCTIONS
"Recommendations from today's MTM visit:                                                      Recommend starting CALM magnesium supplement taking once daily before bed, as discussed this can help with sleep, constipation, and absorbing calcium    Stop taking Unisom     I will message Dr. Ordonez about estradiol cream and getting a paper prescription.     Follow-up: After discussing with Dr. Ordonez    It was great speaking with you today.  I value your experience and would be very thankful for your time in providing feedback in our clinic survey. In the next few days, you may receive an email or text message from [facility] [employer] with a link to a survey related to your  clinical pharmacist.\"     To schedule another MTM appointment, please call the clinic directly or you may call the MTM scheduling line at [phone] or toll-free at [phone].     My Clinical Pharmacist's contact information:                                                      Please feel free to contact me with any questions or concerns you have.      Laura Maier, PharmD  Medication Therapy Management Pharmacist   [facility] and [facility]    "

## 2023-08-21 NOTE — PROGRESS NOTES
Josselyn Ordonez MD [place] [place]  Thank you! I will certainly try the paper RX for Tammy.    Josselyn Curry MD

## 2023-08-21 NOTE — TELEPHONE ENCOUNTER
Called and spoke to patient about the form and she said she will come today or tomorrow to .     Thank you,     Lorri Bright, CMA

## 2023-09-05 ENCOUNTER — OFFICE VISIT (OUTPATIENT)
Dept: AUDIOLOGY | Facility: CLINIC | Age: 68
End: 2023-09-05
Attending: FAMILY MEDICINE
Payer: MEDICARE

## 2023-09-05 DIAGNOSIS — H91.93 DECREASED HEARING OF BOTH EARS: ICD-10-CM

## 2023-09-05 DIAGNOSIS — H90.3 SENSORINEURAL HEARING LOSS (SNHL) OF BOTH EARS: Primary | ICD-10-CM

## 2023-09-05 PROCEDURE — 92557 COMPREHENSIVE HEARING TEST: CPT | Performed by: AUDIOLOGIST

## 2023-09-05 PROCEDURE — 92567 TYMPANOMETRY: CPT | Performed by: AUDIOLOGIST

## 2023-09-05 NOTE — PROGRESS NOTES
AUDIOLOGY REPORT    SUBJECTIVE:  Tammy Dennis is a 68 year old female who was seen in the Audiology Clinic at the [facility] for audiologic evaluation, referred by Josselyn Ordonez M.D. Patient reports difficulty hearing for the past several years. She states she has difficulty understanding her grandchildren and the television. She has long standing constant non-bothersome high pitched tinnitus in both ears. She reports noise exposure being in the  band in Jr. high and high school. She denies otalgia, otorrhea, aural fullness, dizziness, past ear surgery, family history of hearing loss, and prior use of amplification.    OBJECTIVE:  Abuse Screening:  Do you feel unsafe at home or work/school? No  Do you feel threatened by someone? No  Does anyone try to keep you from having contact with others, or doing things outside of your home? No  Physical signs of abuse present? No     Fall Risk Screen:  1. Have you fallen two or more times in the past year? No  2. Have you fallen and had an injury in the past year? Yes - Fell on ice and broke ankle. She states her doctor knows about this.    Timed Up and Go Score (in seconds): not tested  Is patient a fall risk? No  Referral initiated: No  Fall Risk Assessment Completed by Audiology    Otoscopic exam indicates ears are clear of cerumen bilaterally     Pure Tone Thresholds assessed using conventional audiometry with good  reliability from 250-8000 Hz bilaterally using insert earphones and circumaural headphones     RIGHT:  normal hearing 250-3000 Hz sloping to moderate sensorineural hearing loss    LEFT:  normal hearing 250-3000 Hz sloping to moderate sensorineural hearing loss      Tympanogram:    RIGHT: slightly shallow    LEFT:   slightly shallow    Reflexes (reported by stimulus ear):   Could not test due to equipment limitations    Speech Reception Threshold:    RIGHT: 10 dB HL    LEFT:   10 dB HL  Word Recognition Score:      RIGHT: 100% at 60 dB HL using NU-6 recorded word list.    LEFT:   100% at 60 dB HL using NU-6 recorded word list.      ASSESSMENT:   Pure tone audiometry showed normal hearing 250-3000 Hz sloping to moderate sensorineural hearing loss in both ears. Today s results were discussed with the patient in detail.     PLAN:  Patient was counseled regarding hearing loss and impact on communication.  It is recommended that the patient return in 1 year to monitor hearing sensitivity.   Please call this clinic with questions regarding these results or recommendations.      Sohail Arambula., CCC-A  [place] Licensed Audiologist #[id number]

## 2023-10-10 ENCOUNTER — OFFICE VISIT (OUTPATIENT)
Dept: DERMATOLOGY | Facility: CLINIC | Age: 68
End: 2023-10-10
Payer: MEDICARE

## 2023-10-10 DIAGNOSIS — D18.01 CHERRY ANGIOMA: ICD-10-CM

## 2023-10-10 DIAGNOSIS — L57.0 AK (ACTINIC KERATOSIS): Primary | ICD-10-CM

## 2023-10-10 DIAGNOSIS — L82.1 SEBORRHEIC KERATOSES: ICD-10-CM

## 2023-10-10 DIAGNOSIS — L81.4 LENTIGINES: ICD-10-CM

## 2023-10-10 PROCEDURE — 99213 OFFICE O/P EST LOW 20 MIN: CPT | Mod: GC | Performed by: DERMATOLOGY

## 2023-10-10 ASSESSMENT — PAIN SCALES - GENERAL: PAINLEVEL: NO PAIN (0)

## 2023-10-10 NOTE — NURSING NOTE
Dermatology Rooming Note    Tammy Dennis's goals for this visit include:   Chief Complaint   Patient presents with    Derm Problem     FBSE- no spots of concern.     Dianna Kern, EMT

## 2023-10-10 NOTE — PROGRESS NOTES
[facility] Dermatology Note  Encounter Date: Oct 10, 2023  Office Visit     Dermatology Problem List:  1. Actinic keratoses - cryo, prior 5-fluorouracil (outside dermatologist)     ____________________________________________    Assessment & Plan:     # Cherry angiomas, seborrheic keratoses, melanocytic nevi, lentigines.  - NTD: No further management at this time.  - Sun protection: Counseled SPF30+ sunscreen, UPF clothing, sun avoidance, tanning bed avoidance.       Procedures Performed:   None      Follow-up: 1 year(s) in-person, or earlier for new or changing lesions    Staff and Resident:    Mino GRIFFITH MD, discussed and evaluated the patient with Dr. Hutchins.  Ruth GRIFFITH MD, saw this patient with the resident and agree with the resident s findings and plan of care as documented in the resident s note.      ____________________________________________    CC: Derm Problem (FBSE- no spots of concern.)    HPI:  Ms. Tammy Dennis is a(n) 68 year old female who presents today as a return patient for skin check.    Had some AKs frozen at last visit. Doing great without any concerns. Wondering about an area on chest where had some AKs removed at a previous dermatology appointment. No growing or painful lesions. Uses SPF30.    Patient is otherwise feeling well, without additional skin concerns.    Labs Reviewed:  N/A    Physical Exam:  Vitals: There were no vitals taken for this visit.  SKIN: Total skin excluding the undergarment areas was performed. The exam included the head/face, neck, both arms, chest, back, abdomen, both legs, digits and/or nails.   - There are dome shaped bright red papules on the chest.   - Multiple regular brown pigmented macules and papules are identified on the trunk and extremities.   - There are waxy stuck on tan to brown papules on the trunk and extremities.  - Multiple hyperpigmented macules on sun exposed areas  - No other lesions of concern  on areas examined.     Medications:  Current Outpatient Medications   Medication    ascorbic acid (VITAMIN C) 250 MG CHEW chewable tablet    Black Cohosh 450 MG CAPS    Calcium Carb-Cholecalciferol (CALCIUM 500 + D PO)    cyanocobalamin (VITAMIN B-12) 1000 MCG tablet    docusate sodium (COLACE) 100 MG capsule    EPINEPHrine (ANY BX GENERIC EQUIV) 0.3 MG/0.3ML injection 2-pack    estradiol (ESTRACE) 0.1 MG/GM vaginal cream    Melatonin 10 MG TABS tablet    metFORMIN (GLUCOPHAGE) 850 MG tablet    Psyllium-Calcium (METAMUCIL PLUS CALCIUM) CAPS    traZODone (DESYREL) 50 MG tablet    vitamin D3 (CHOLECALCIFEROL) 50 mcg (2000 units) tablet    zolpidem (AMBIEN) 5 MG tablet    COMPOUNDED NON-CONTROLLED SUBSTANCE (CMPD RX) - PHARMACY TO MIX COMPOUNDED MEDICATION    conjugated estrogens (PREMARIN) 0.625 MG/GM vaginal cream     No current facility-administered medications for this visit.      Past Medical History:   Patient Active Problem List   Diagnosis    Prediabetes    Screening for colorectal cancer    Insomnia    Obstructive sleep apnea syndrome    Breast cancer screening    Screening for osteoporosis    History of actinic keratoses    Lipid screening    Osteoarthritis of right foot     Past Medical History:   Diagnosis Date    History of actinic keratoses 11/14/2022    Previously treated with Efudex    Insomnia 7/27/2010    Prediabetes 8/12/2022    A1c 5.8 in August 2022    Sleep apnea 4/27/1997    Wears mandibular appliance       CC No referring provider defined for this encounter. on close of this encounter.

## 2023-10-10 NOTE — PATIENT INSTRUCTIONS
"Skin looks great today! Keep up the good work.    Sun Protection    Sunscreen   What does \"broad spectrum mean\"?  Broad spectrum sunscreens protect against both UVA and UVB radiation. UVC is filtered out by the ozone layer.     What does SPF mean?   SPF stands for  Sun Protection Factor  and represents the ability to screen only UVB (burning) rays. UVB rays are mostly blocked in all sunscreens, but only those that contain titanium dioxide, zinc oxide, mexoryl or Parsol 1789 (avobenzone) block the UVA spectrum. Even though a sunscreen is labeled  UVA/UVB Protection  that is not entirely accurate because products that only partially protect against UVA can claim to protect against both UVA and UVB.     What SPF should I chose?   Aim to get a sunscreen that is at least sun protection factor (SPF) 30. SPF 15 provides about 92-93% coverage, SPF 30 about 95-97% coverage, and SPF 45 about 98% coverage. That is to say, SPF 30 is not twice as good as SPF 15. The reason why we recommend SPF 30 is because we are usually only putting on half the necessary amount of sunscreen to achieve the advertised protection. That means that it is very possible that your SPF 30 sunscreen is only providing you with SPF 15 coverage based on how much you are applying. SPF 15 (92-93% coverage) is the absolute minimal that we recommend. Similarly, the benefit of sunscreens with SPF higher than 50 is that even if you put on less than the required amount, you are likely still getting good protection (ex: even if you apply only half the recommended amount of , it should still provide you with an SPF of 50).     How much should I apply?  If covering your whole body, you should be using 30 grams, or one ounce, which is how much is in one shot glass! That s a THICK layer! May times, you are only applying half the recommended amount, which means that you are only getting half the SPF (for example, you may be using SPF 30 but if you're only " applying half the recommended amount, you're only getting SPF 15 protection.      When do I need to wear sunscreen?  Every day, rain or shine! Even on a rainy day or a day when you are only indoors, you are still being exposed harmful UV radiation from the sun. We usually recommend physical/mineral sunscreens (active ingredient is titanium dioxide or zinc oxide) as these ingredients have been around for many years, there is no concern of them being absorbed into the bloodstream, and they are coral reef friendly! However, the best sunscreen is the one that you will use everyday.     What about my kids?  Sunscreen is not recommended for infants under the age of 6 months. Use clothing, shade and sun avoidance for small infants. For kids older than 6 months, we recommend that you should use only mineral/physical sunscreens that have zinc oxide as the active ingredient. Sun-protective clothing and hats are also important for people of all ages.     Sun protective Clothing:  www.coolibar.ITS [place]  www.sunprecautions.com  www.dana.ITS [place]OL    Do I need tinted sunscreen?  There is more and more research showing that visible light can also lead to discoloration (such as melasma). Tinted sunscreens (which contain iron oxides) protect against visible light as an added bonus.     What brands do you recommend?    Physical/Mineral Sunscreens (in no particular order)  Elta MD UV Physical Broad-Spectrum SPF 41 Sunscreen (Tinted, $33)  Skin Ceuticals Physical Fusion UV Defense SPF 50 (Tinted, $34)  Unsun Mineral Tinted Face Sunscreen (Tinted, with 2 shade ranges, $29)  It Cosmetics CC+ Cream with SPF 50+ (Tinted, can also double as foundation/coverage -- great range of shades, $40)  Biossance Squalane + Zinc Sheer Mineral Sunscreen SPF 30 PA +++ (goes on white then blends in, $30)  Cerave 100% Mineral Sunscreen SPF 50 Face (good for sensitive skin, $15)  La Roche Posay Anthelios Mineral Zinc Oxide Sunscreen SPF 50 ($35)  La Roche Posay  "Anthelios Mineral Tinted Sunscreen for Face SPF 50 ($35)  Think Sport Sunscreen (great for sports, though has more of a white cast, $20)  Think Baby Sunscreen (for kids, $21)  Color Science Sunforgettable Total Protection Brush On Shield SPF 50 (Multiple tints, $130)    Chemical Sunscreens  Geeta Rouleau Weightless Protection SPF 30 ($48)  Jaylen SPF Brightening Moisturizer ($30)  Urban Skin Complexion Protection Moisturizer SPF 30 ($20)  Total Defense + Repair Broad Spectrum SPF 34 ($68)  Clarins UV PLUS Anti-Pollution Sunscreen Multi-Protection Tint SPF 50 (Multiple tints, $45)  Neutrogena Healthy Skin Glow Sheers Tinted Moisturizer with SPF 20 (Multiple tints, $11)    The ABCDEs of Melanoma  Skin cancer can develop anywhere on the skin. Once a month, take a look at your entire body and note any changing moles or spots. Ask someone for help when checking your skin, especially for hard to see places such as your back. If you notice a mole that looks different from others, or one that changes, enlarges, itches, or bleeds, you should see a dermatologist.    Asymmetry, Border (irregularity), Color (not uniform, changes in color), Diameter (greater than 6 mm which is about the size of a pencil eraser), and Evolving (any changes in pre-existing moles). In short, look for the \"ugly duckling.\" You want all of the spots on your body to look like cousins (like they could be related). If something stands out, take a photo of it and make an appointment to have it evaluated.     "

## 2023-10-10 NOTE — LETTER
10/10/2023       RE: Tammy Dennis  [address] [place] [address]     Dear Colleague,    Thank you for referring your patient, Tammy Dennis, to the [facility] [place] at [facility]. Please see a copy of my visit note below.    [facility] Dermatology Note  Encounter Date: Oct 10, 2023  Office Visit     Dermatology Problem List:  1. Actinic keratoses - cryo, prior 5-fluorouracil (outside dermatologist)     ____________________________________________    Assessment & Plan:     # Cherry angiomas, seborrheic keratoses, melanocytic nevi, lentigines.  - NTD: No further management at this time.  - Sun protection: Counseled SPF30+ sunscreen, UPF clothing, sun avoidance, tanning bed avoidance.       Procedures Performed:   None      Follow-up: 1 year(s) in-person, or earlier for new or changing lesions    Staff and Resident:    IMino MD, discussed and evaluated the patient with Dr. Hutchins.  IRuth MD, saw this patient with the resident and agree with the resident s findings and plan of care as documented in the resident s note.      ____________________________________________    CC: Derm Problem (FBSE- no spots of concern.)    HPI:  Ms. Tammy Dennis is a(n) 68 year old female who presents today as a return patient for skin check.    Had some AKs frozen at last visit. Doing great without any concerns. Wondering about an area on chest where had some AKs removed at a previous dermatology appointment. No growing or painful lesions. Uses SPF30.    Patient is otherwise feeling well, without additional skin concerns.    Labs Reviewed:  N/A    Physical Exam:  Vitals: There were no vitals taken for this visit.  SKIN: Total skin excluding the undergarment areas was performed. The exam included the head/face, neck, both arms, chest, back, abdomen, both legs, digits and/or  nails.   - There are dome shaped bright red papules on the chest.   - Multiple regular brown pigmented macules and papules are identified on the trunk and extremities.   - There are waxy stuck on tan to brown papules on the trunk and extremities.  - Multiple hyperpigmented macules on sun exposed areas  - No other lesions of concern on areas examined.     Medications:  Current Outpatient Medications   Medication    ascorbic acid (VITAMIN C) 250 MG CHEW chewable tablet    Black Cohosh 450 MG CAPS    Calcium Carb-Cholecalciferol (CALCIUM 500 + D PO)    cyanocobalamin (VITAMIN B-12) 1000 MCG tablet    docusate sodium (COLACE) 100 MG capsule    EPINEPHrine (ANY BX GENERIC EQUIV) 0.3 MG/0.3ML injection 2-pack    estradiol (ESTRACE) 0.1 MG/GM vaginal cream    Melatonin 10 MG TABS tablet    metFORMIN (GLUCOPHAGE) 850 MG tablet    Psyllium-Calcium (METAMUCIL PLUS CALCIUM) CAPS    traZODone (DESYREL) 50 MG tablet    vitamin D3 (CHOLECALCIFEROL) 50 mcg (2000 units) tablet    zolpidem (AMBIEN) 5 MG tablet    COMPOUNDED NON-CONTROLLED SUBSTANCE (CMPD RX) - PHARMACY TO MIX COMPOUNDED MEDICATION    conjugated estrogens (PREMARIN) 0.625 MG/GM vaginal cream     No current facility-administered medications for this visit.      Past Medical History:   Patient Active Problem List   Diagnosis    Prediabetes    Screening for colorectal cancer    Insomnia    Obstructive sleep apnea syndrome    Breast cancer screening    Screening for osteoporosis    History of actinic keratoses    Lipid screening    Osteoarthritis of right foot     Past Medical History:   Diagnosis Date    History of actinic keratoses 11/14/2022    Previously treated with Efudex    Insomnia 7/27/2010    Prediabetes 8/12/2022    A1c 5.8 in August 2022    Sleep apnea 4/27/1997    Wears mandibular appliance       CC No referring provider defined for this encounter. on close of this encounter.

## 2023-10-17 ENCOUNTER — TELEPHONE (OUTPATIENT)
Dept: DERMATOLOGY | Facility: CLINIC | Age: 68
End: 2023-10-17

## 2023-10-17 ENCOUNTER — OFFICE VISIT (OUTPATIENT)
Dept: FAMILY MEDICINE | Facility: CLINIC | Age: 68
End: 2023-10-17
Payer: MEDICARE

## 2023-10-17 VITALS
WEIGHT: 157 LBS | TEMPERATURE: 98 F | DIASTOLIC BLOOD PRESSURE: 72 MMHG | BODY MASS INDEX: 27.82 KG/M2 | SYSTOLIC BLOOD PRESSURE: 115 MMHG | RESPIRATION RATE: 20 BRPM | HEIGHT: 63 IN | OXYGEN SATURATION: 98 % | HEART RATE: 68 BPM

## 2023-10-17 DIAGNOSIS — R35.0 URINARY FREQUENCY: ICD-10-CM

## 2023-10-17 DIAGNOSIS — R73.03 PREDIABETES: Primary | ICD-10-CM

## 2023-10-17 DIAGNOSIS — Z12.4 CERVICAL CANCER SCREENING: ICD-10-CM

## 2023-10-17 DIAGNOSIS — Z00.00 PREVENTATIVE HEALTH CARE: ICD-10-CM

## 2023-10-17 DIAGNOSIS — K59.00 CONSTIPATION, UNSPECIFIED CONSTIPATION TYPE: ICD-10-CM

## 2023-10-17 PROCEDURE — 87624 HPV HI-RISK TYP POOLED RSLT: CPT | Performed by: FAMILY MEDICINE

## 2023-10-17 PROCEDURE — 99215 OFFICE O/P EST HI 40 MIN: CPT | Performed by: FAMILY MEDICINE

## 2023-10-17 PROCEDURE — G0145 SCR C/V CYTO,THINLAYER,RESCR: HCPCS | Performed by: FAMILY MEDICINE

## 2023-10-17 NOTE — PATIENT INSTRUCTIONS
- Keep working on making those healthy food choices with lean proteins, healthy fats, and plenty of fruits and vegetables    - Continue incorporating the healthy movements on a regular basis. Find what you love and do it. Start to get into more regular strength training.     - Keep taking your metformin twice daily. We'll recheck your A1c next year.     - Work on increasing your water intake.     - Vaccines: get your RSV vaccine and your 2nd Shingles vaccine at your pharmacy    - I will share your pap smear results as they return via [facility].    - Let's check back in about 4 months on your healthy habits or sooner if helpful! 1 month after starting PT might be a good time

## 2023-10-17 NOTE — PROGRESS NOTES
Assessment & Plan     Prediabetes  A1c of 5.7 in August, down from 5.8 previously. Working on healthy lifestyle changes and taking metformin for both weight loss and prevention of progression to DM.   - Encouraged ongoing healthy lifestyle changes - starting a strength-training regimen, group classes to promote consistent aerobic exercise, reduction in saturated fats/processed food/eating out and more emphasis on fruits/vegetables/lean proteins/healthy fats  - Encouraged more water intake  - Continue metformin 850 mg BID.    Constipation, unspecified constipation type  Longstanding issue. Likely stems from some pelvic floor dysfunction with associated urinary symptoms and inadequate water and fiber intake.   - Continue current fiber supplement and stool softener regimen  - Increase water intake and dietary fiber  - Pelvic floor PT    Urinary frequency  History of urinary incontinence that is largely stress with some urge incontinence per history.  Perhaps more of an urge component on history today. Bladder irritants are likely contributing.   - Continue topical estrogen.   - Work on reducing bladder irritants  - Bladder training  - Pelvic floor PT  - Could consider medications in follow up if not improving with above.    Cervical cancer screening  Last done in 7/2020 at age 65 yr. No HPV data available. No history of abnormalities. Likely will be the last pap pending a normal test.   - Gynecologic Cytology (PAP); Future  - Gynecologic Cytology (PAP)    Preventive health care  Reviewed vaccines. Received influenza booster and COVID booster in September. Due for RSV and Shingles #2  - Advised vaccines as above at her pharmacy.    I spent a total of 54 minutes on the day of the visit.   Time spent by me doing chart review, history and exam, documentation and further activities per the note      Follow up in about 4 months or sooner as needed.    Josselyn Ordonez MD  [facility]  "ADAM Munoz is a 68 year old, presenting for the following health issues:  Weight Check      HPI     Here to follow up on weight. She has gained a few pounds since our last visit. She attributes this to the fact that she has been traveling more to visit family in [place] and [place]. She was eating out more while traveling. She's increasingly motivated to start working out more and wants to start by joining a gym. She has been walking frequently but needs some additional structure in class form to get things done. She started the metformin. Struggling to remember to take it at dinner time all the time, but getting 90% of the doses in. She's noticed some acid reflux on this but it's not too bothersome. Very motivated to make some changes as she plans for her 50th high school reunion next year.    She continues to have some issues with harder stools, though generally daily BMs. Uses a gentle fiber laxative supplement in addition to a stool softener and regular fiber supplement. Struggling to drink enough water due to urinary frequency and leakage if she waits too long. Some issues with sneezing and coughing depending on how full her bladder is. Has noticed some differences with certain bladder irritants like citrus, carbonation, etc.    Due for pap smear screening today. Last done in 7/2020 at age 65 yr. No HPV data available. No history of previous abnormalities. No symptoms of concern such as vaginal bleeding.        Objective    /72   Pulse 68   Temp 98  F (36.7  C) (Oral)   Resp 20   Ht 1.61 m (5' 3.39\")   Wt 71.2 kg (157 lb)   SpO2 98%   BMI 27.47 kg/m    Body mass index is 27.47 kg/m .  Physical Exam   GENERAL: healthy, alert and no distress  RESP: normal rate and effort   (female): female external genitalia with some mucosal atrophy, slightly narrow introitus with normal cervix and moderate leukorrhea  MS: no gross musculoskeletal defects noted, no edema            "

## 2023-10-20 LAB
BKR LAB AP GYN ADEQUACY: NORMAL
BKR LAB AP GYN INTERPRETATION: NORMAL
BKR LAB AP HPV REFLEX: NORMAL
BKR LAB AP PREVIOUS ABNORMAL: NORMAL
PATH REPORT.COMMENTS IMP SPEC: NORMAL
PATH REPORT.COMMENTS IMP SPEC: NORMAL
PATH REPORT.RELEVANT HX SPEC: NORMAL

## 2023-10-24 LAB
HUMAN PAPILLOMA VIRUS 16 DNA: NEGATIVE
HUMAN PAPILLOMA VIRUS 18 DNA: NEGATIVE
HUMAN PAPILLOMA VIRUS FINAL DIAGNOSIS: NORMAL
HUMAN PAPILLOMA VIRUS OTHER HR: NEGATIVE

## 2023-11-15 ENCOUNTER — TELEPHONE (OUTPATIENT)
Dept: DERMATOLOGY | Facility: CLINIC | Age: 68
End: 2023-11-15
Payer: MEDICARE

## 2023-11-15 NOTE — TELEPHONE ENCOUNTER
Patient Contacted pt stated she already has an appointment at a different location    Appointment type: Return   Provider: Dr. Hutchins  Return date: October 2024  Specialty phone number: [phone]

## 2023-11-29 ENCOUNTER — HOSPITAL ENCOUNTER (OUTPATIENT)
Dept: BONE DENSITY | Facility: HOSPITAL | Age: 68
Discharge: HOME | End: 2023-11-29
Attending: FAMILY MEDICINE
Payer: MEDICARE

## 2023-11-29 ENCOUNTER — ANCILLARY PROCEDURE (OUTPATIENT)
Dept: MAMMOGRAPHY | Facility: CLINIC | Age: 68
End: 2023-11-29
Attending: FAMILY MEDICINE
Payer: MEDICARE

## 2023-11-29 DIAGNOSIS — Z12.31 ENCOUNTER FOR SCREENING MAMMOGRAM FOR BREAST CANCER: ICD-10-CM

## 2023-11-29 DIAGNOSIS — Z78.0 POSTMENOPAUSAL STATUS: ICD-10-CM

## 2023-11-29 PROCEDURE — 77080 DXA BONE DENSITY AXIAL: CPT

## 2023-11-29 PROCEDURE — 77067 SCR MAMMO BI INCL CAD: CPT

## 2023-12-15 ENCOUNTER — MYC MEDICAL ADVICE (OUTPATIENT)
Dept: FAMILY MEDICINE | Facility: CLINIC | Age: 68
End: 2023-12-15
Payer: MEDICARE

## 2023-12-15 DIAGNOSIS — R73.03 PREDIABETES: ICD-10-CM

## 2024-03-04 ENCOUNTER — MYC MEDICAL ADVICE (OUTPATIENT)
Dept: FAMILY MEDICINE | Facility: CLINIC | Age: 69
End: 2024-03-04
Payer: MEDICARE

## 2024-03-21 ENCOUNTER — THERAPY VISIT (OUTPATIENT)
Dept: PHYSICAL THERAPY | Facility: REHABILITATION | Age: 69
End: 2024-03-21
Payer: MEDICARE

## 2024-03-21 DIAGNOSIS — K59.00 CONSTIPATION, UNSPECIFIED CONSTIPATION TYPE: Primary | ICD-10-CM

## 2024-03-21 DIAGNOSIS — N39.3 FEMALE STRESS INCONTINENCE: ICD-10-CM

## 2024-03-21 PROCEDURE — 97535 SELF CARE MNGMENT TRAINING: CPT | Mod: GP

## 2024-03-21 PROCEDURE — 97161 PT EVAL LOW COMPLEX 20 MIN: CPT | Mod: GP

## 2024-03-21 NOTE — PROGRESS NOTES
"PHYSICAL THERAPY EVALUATION  Type of Visit: Evaluation    See electronic medical record for Abuse and Falls Screening details.    Subjective       Presenting condition or subjective complaint: Frequent urinination and occaisional leakage  Date of onset: 08/15/23 (MD order)    Relevant medical history: Bladder or bowel problems; Diabetes; Incontinence; Overweight; Sleep disorder like apnea   Dates & types of surgery: 85 - , 87 - , 89 - ,  - Uvulectomy, 19 - Foot surgery    Prior diagnostic imaging/testing results:       Prior therapy history for the same diagnosis, illness or injury: No      Prior Level of Function - ind.    Living Environment  Social support: With a significant other or spouse   Type of home: House; 1 level; Basement   Stairs to enter the home: Yes 4 Is there a railing: Yes   Ramp: No   Stairs inside the home: Yes 14 Is there a railing: Yes   Help at home: None  Equipment owned:       Employment: No    Hobbies/Interests: Piano, Singing, Gardening, Traveling, Hiking, Canoeing    Patient goals for therapy:  decrease constant feeling of needing to urinate; leaking with waiting too long; and improve bowel habits      Pt reports today for urinary urge and regularity of bowels. Pt reports that she only has urine leakage when she's really rushing to go to the bathroom, but what is most bothersome is the feeling of having to urinate all the time. Pt reports that she didn't have to think so hard about passing stool every day although sometimes it may have to do with not drinking enough. Pt takes stool softener fiber supplements and CALM Mg Citrate at nigh before she goes to bed. Pt is also on Metformin right now. Pt has had to \"up\" the fiber in the last week. Pt feels that now that she is trying to lose weight and eat less carbs she's having more issues with bowels and some of her dieting may have impacted her relationship with food. Pt typically walks " several times around a nearby park frequently and will be going to the  soon. Pt moved here 1.5 years ago. Pt typically passes stool in the morning; she wakes up between 7 and 8am, then passes stool around 9-11am. Typically has yogurt with some raspberries and strawberries, some raisins, a little bit of granola in the morning. Pt does report some pain with intercourse but has been trying a new hormonal medication. Upon assessment, pt demonstrates significant pelvic floor weakness and difficulty with coordination such as anson when cued to bear down. Prolapse assessment is difficult as pt has difficulty bearing down, but mild rectocele may be palpated upon internal assessment. Pt will benefit from skilled PT intervention including strengthening, pt education and biomechanics.     Objective      PELVIC EVALUATION  ADDITIONAL HISTORY:  Sex assigned at birth: Female  Gender identity: Female    Pronouns: She/Her Hers      Bladder History:  Feels bladder filling: Yes  Triggers for feeling of inability to wait to go to the bathroom: No    How long can you wait to urinate: 2 hours during the day, 3 or 4 hours at night  Voids per day: 12x per day, no straining to urinate but it does start to dribble toward the end so then she tries to push  Most of the time pt feels that she is able to completely empty in the moment.   Gets up at night to urinate: Yes 2 or 3  Can stop the flow of urine when urinating: No  Volume of urine usually released: Medium   Other issues:  within half hour after urinating, already feels like she has to go. The feeling of having to go is very irritating  Number of bladder infections in last 12 months:    Fluid intake per day: 8 - 16 oz. 12 oz., sometimes 24 oz.  Could be less than 8-16oz water; 1 can of soda per day, no coffee   Medications taken for bladder: No     Activities causing urine leak: Hurrying to the bathroom due to a strong urge to urinate (pee)    Amount of urine typically leaked: 1  or 2 drops -- 2-3x per month (lately it has been good)  Pads used to help with leaking: No        Bowel History:  Frequency of bowel movement: Once a day  Consistency of stool: Hard    Ignores the urge to defecate: No  Other bowel issues: Straining to have bowel movement - not typically.   Length of time spent trying to have a bowel movement: A few seconds    Sexual Function History:   Sexual orientation: Straight    Sexually active: Yes  Lubrication used: Yes Yes  Pelvic pain: Initial penetration (rectal or vaginal)  depends on the day -- started a new hormone medication and mixed results with that.   Pain or difficulty with orgasms/erection/ejaculation: No    State of menopause: Post-menopause (I am done with menopause)  Hormone medications: Yes Estradiol Vag Cream    Are you currently pregnant: No, Number of previous pregnancies: 3, Number of deliveries: 3, If you have delivered before, did you have any of these issues during delivery:  delivery, Have you been diagnosed with pelvic prolapse or abdominal separation: No, Do you get regular exercise: Yes, I do this type of exercise: Walking 2 miles/day, Have you tried pelvic floor strengthening exercises for 4 weeks: No, Do you have any history of trauma that is relevant to your care that you d like to share: No    Discussed reason for referral regarding pelvic health needs and external/internal pelvic floor muscle examination with patient/guardian.  Opportunity provided to ask questions and verbal consent for assessment and intervention was given.    PAIN: Pain Location: pt reports mild internal pelvic pain with initial penetration  POSTURE: WNL  LUMBAR SCREEN: AROM WNL  HIP SCREEN:  Strength: WNL   Functional Strength Testing: NT    PELVIC/SI SCREEN:  WNL   BREATHING SYMMETRY: WNL    PELVIC EXAM  External Visual Inspection:  At rest: Elevated perineal body  With voluntary pelvic floor contraction: Absent  Relaxation of PFM: Non-relaxation  With  intra-abdominal pressure: Cough: Perineal elevation  Valsalva: Perineal elevation  Bearing down as defecation: No change    Integumentary:   Introitus: moisture around the perineum -- urinary    External Digital Palpation per Perineum: denies tenderness    Internal Digital Palpation:  Per Vagina:  Pt reports some mild tenderness internally L>R, layers 1-3  Tenderness  Myofascial Resistance to Palpation: Firm  Digital Muscle Performance: P (Power): 2  E (Endurance): 2  R (Repetitions): 6  F (Fast Twitch): 7  Compensations: Gluts  Relaxation Post-Contraction: Partial/delayed relaxation      Pelvic Organ Prolapse:   Posterior: At the level of the hymen  Difficult to fully assess as pt has difficulty bearing down, but S&S rectocele palpated upon internal assessment    ABDOMINAL ASSESSMENT    Abdominal Activation/Strength: difficulty engaging    Fascial Tension/Restriction: WNL    Assessment & Plan   CLINICAL IMPRESSIONS  Medical Diagnosis: Constipation, unspecified constipation type; Female stress incontinence    Treatment Diagnosis: Constipation, unspecified constipation type; Female stress incontinence   Impression/Assessment: Patient is a 69 year old female with constipation and urinary urgency complaints.  The following significant findings have been identified: Pain, Decreased ROM/flexibility, Decreased joint mobility, Decreased strength, Impaired balance, Decreased proprioception, Impaired sensation, Impaired gait, Impaired muscle performance, Decreased activity tolerance, and Impaired posture. These impairments interfere with their ability to perform self care tasks, recreational activities, household mobility, and community mobility as compared to previous level of function.     Clinical Decision Making (Complexity):  Clinical Presentation: Stable/Uncomplicated  Clinical Presentation Rationale: based on medical and personal factors listed in PT evaluation  Clinical Decision Making (Complexity): Low  complexity    PLAN OF CARE  Treatment Interventions:  Modalities: Biofeedback, E-stim  Interventions: Gait Training, Manual Therapy, Neuromuscular Re-education, Therapeutic Activity, Therapeutic Exercise, Self-Care/Home Management    Long Term Goals     PT Goal 1  Goal Identifier: HEP  Goal Description: Pt will be independent in [facility] for self management of symptoms  Rationale: to maximize safety and independence with performance of ADLs and functional tasks  Target Date: 04/11/24  PT Goal 2  Goal Identifier: PF strength  Goal Description: Pt's pelvic floor MMT will improve to >4/5 in all positions in order to return to perform an appropriate kegel with varying functional activities  Rationale: to maximize safety and independence with performance of ADLs and functional tasks  Target Date: 05/21/24  PT Goal 3  Goal Identifier: Urinary frequency  Goal Description: Pt will report voiding once every 2-4 hours with minimal to no leakage between voids in order to demonstrate more normalized bathroom habits for return to functional activity.  Rationale: to maximize safety and independence with performance of ADLs and functional tasks  Target Date: 06/21/24      Frequency of Treatment: 1x/week  Duration of Treatment: 3 months    Education Assessment:   Learner/Method: Patient    Risks and benefits of evaluation/treatment have been explained.   Patient/Family/caregiver agrees with Plan of Care.     Evaluation Time:     PT Eval, Low Complexity Minutes (31750): 30     Signing Clinician: Cheryl Coyne, PT      [facility]                                                                                   OUTPATIENT PHYSICAL THERAPY      PLAN OF TREATMENT FOR OUTPATIENT REHABILITATION   Patient's Last Name, First Name, Tammy Hamilton YOB: 1955   Provider's Name   [facility]   Medical Record No.  [id number]     Onset Date: 08/15/23 (MD  order)  Start of Care Date: 03/21/24     Medical Diagnosis:  Constipation, unspecified constipation type; Female stress incontinence      PT Treatment Diagnosis:  Constipation, unspecified constipation type; Female stress incontinence Plan of Treatment  Frequency/Duration: 1x/week/ 3 months    Certification date from 03/21/24 to 06/21/24         See note for plan of treatment details and functional goals     Cheryl Coyne, PT                         I CERTIFY THE NEED FOR THESE SERVICES FURNISHED UNDER        THIS PLAN OF TREATMENT AND WHILE UNDER MY CARE .             Physician Signature               Date    X_____________________________________________________                  Referring Provider:  Josselyn Ordonez    Initial Assessment  See Epic Evaluation- Start of Care Date: 03/21/24

## 2024-03-28 ENCOUNTER — THERAPY VISIT (OUTPATIENT)
Dept: PHYSICAL THERAPY | Facility: REHABILITATION | Age: 69
End: 2024-03-28
Payer: MEDICARE

## 2024-03-28 DIAGNOSIS — K59.00 CONSTIPATION, UNSPECIFIED CONSTIPATION TYPE: Primary | ICD-10-CM

## 2024-03-28 DIAGNOSIS — N39.3 FEMALE STRESS INCONTINENCE: ICD-10-CM

## 2024-03-28 PROCEDURE — 97535 SELF CARE MNGMENT TRAINING: CPT | Mod: GP

## 2024-03-28 PROCEDURE — 97112 NEUROMUSCULAR REEDUCATION: CPT | Mod: GP

## 2024-04-04 ENCOUNTER — THERAPY VISIT (OUTPATIENT)
Dept: PHYSICAL THERAPY | Facility: REHABILITATION | Age: 69
End: 2024-04-04
Payer: MEDICARE

## 2024-04-04 DIAGNOSIS — K59.00 CONSTIPATION, UNSPECIFIED CONSTIPATION TYPE: Primary | ICD-10-CM

## 2024-04-04 DIAGNOSIS — N39.3 FEMALE STRESS INCONTINENCE: ICD-10-CM

## 2024-04-04 PROCEDURE — 97110 THERAPEUTIC EXERCISES: CPT | Mod: GP

## 2024-04-04 PROCEDURE — 97112 NEUROMUSCULAR REEDUCATION: CPT | Mod: GP

## 2024-04-11 ENCOUNTER — THERAPY VISIT (OUTPATIENT)
Dept: PHYSICAL THERAPY | Facility: REHABILITATION | Age: 69
End: 2024-04-11
Payer: MEDICARE

## 2024-04-11 DIAGNOSIS — N39.3 FEMALE STRESS INCONTINENCE: ICD-10-CM

## 2024-04-11 DIAGNOSIS — K59.00 CONSTIPATION, UNSPECIFIED CONSTIPATION TYPE: ICD-10-CM

## 2024-04-11 PROCEDURE — 97110 THERAPEUTIC EXERCISES: CPT | Mod: GP

## 2024-04-11 PROCEDURE — 97535 SELF CARE MNGMENT TRAINING: CPT | Mod: GP

## 2024-04-22 ENCOUNTER — OFFICE VISIT (OUTPATIENT)
Dept: FAMILY MEDICINE | Facility: CLINIC | Age: 69
End: 2024-04-22
Payer: MEDICARE

## 2024-04-22 VITALS
HEART RATE: 70 BPM | BODY MASS INDEX: 26.58 KG/M2 | HEIGHT: 63 IN | DIASTOLIC BLOOD PRESSURE: 71 MMHG | RESPIRATION RATE: 16 BRPM | SYSTOLIC BLOOD PRESSURE: 111 MMHG | TEMPERATURE: 98 F | WEIGHT: 150 LBS | OXYGEN SATURATION: 98 %

## 2024-04-22 DIAGNOSIS — N39.3 FEMALE STRESS INCONTINENCE: Primary | ICD-10-CM

## 2024-04-22 DIAGNOSIS — R73.03 PREDIABETES: ICD-10-CM

## 2024-04-22 DIAGNOSIS — M54.2 NECK PAIN: ICD-10-CM

## 2024-04-22 DIAGNOSIS — G47.00 INSOMNIA, UNSPECIFIED TYPE: ICD-10-CM

## 2024-04-22 DIAGNOSIS — K59.04 CHRONIC IDIOPATHIC CONSTIPATION: ICD-10-CM

## 2024-04-22 PROCEDURE — 99214 OFFICE O/P EST MOD 30 MIN: CPT | Performed by: FAMILY MEDICINE

## 2024-04-22 RX ORDER — ZOLPIDEM TARTRATE 5 MG/1
2.5 TABLET ORAL
Qty: 30 TABLET | Refills: 1 | Status: SHIPPED | OUTPATIENT
Start: 2024-04-22 | End: 2024-09-09

## 2024-04-22 NOTE — PROGRESS NOTES
"  Assessment & Plan     Female stress incontinence  Chronic. Largely stress incontinence with some component of urge incontinence. Has already seen some benefit with pelvic floor PT!  - Continue pelvic floor PT   - Bladder training  - Topical estrogen  - Close follow up to revisit a medication trial for urge component if not improving    Chronic idiopathic constipation   Longstanding issue. Likely stems from some pelvic floor dysfunction with associated urinary symptoms and inadequate water and fiber intake.   - Continue pelvic floor PT  - Continue current fiber supplement and stool softener regimen  - Increase water intake and fruits that start with 'p'  - Magnesium    Prediabetes  A1c of 5.7 in August 2023. Making significant strides towards healthy lifestyle changes and taking metformin for both weight loss and prevention of progression to DM.  - Continue regular exercise & healthy dietary efforts  - Continue metformin  - Repeat A1c and check B12 levels in follow up this fall    Insomnia, unspecified type  On trazodone daily. Uses Ambien sparingly with travel.   - zolpidem (AMBIEN) 5 MG tablet; Take 0.5 tablets (2.5 mg) by mouth nightly as needed for sleep    Neck pain  Pain over SCM insertion site with intermittent history of spasm. Suspect cervical strain. No radiculopathy or myelopathy.   - Advised on conservative management: massage, acupuncture, heat/ice, stretching, Tylenol  - Close follow up if worsening      I spent a total of 37 minutes on the day of the visit.   Time spent by me doing chart review, history and exam, documentation and further activities per the note      BMI  Estimated body mass index is 26.25 kg/m  as calculated from the following:    Height as of this encounter: 1.61 m (5' 3.39\").    Weight as of this encounter: 68 kg (150 lb).       Follow up in 6 months for preventive health exam and A1c recheck.    Matt Munoz is a 69 year old, presenting for the following health " "issues:  Diabetes (Follow-up and pt/) and Refill Request (No other concerns)        4/22/2024     1:58 PM   Additional Questions   Roomed by Geeta'         4/22/2024    Information    services provided? No     HPI     Follow up urinary incontinence and pelvic floor PT. She started about a month ago. Leaking is improving. Working on bladder training. Now working with the more hands-on therapist for constipation and planning follow up on this. Is pleased with the results already and will continue.    Constipation has been a little worse since she started changing her diet to promote weight loss. She doesn't love drinking fluids due to history of incontinence. Feels like the CALM powder isn't enough. Could eat some more fiber. Working on adding in more varied fruits and vegetables.     Is out walking every day about 2 miles.   Started Weight [employer] and using an andres. She feels that she has lost quite a bit of abdominal fat. She feels like it is sustainable.     Planning upcoming travel over the spring and summer to the [facility] [place] [facility]. Uses zolpidem sparingly and needs a refill. Mostly using it for travel or difficulty winding down after entertaining. Doesn't have any issues going to the bathroom.     1-2 times per month she develops a horrendous pain. It feels like something will break or spasm in her neck. Not sure what brings it on or why. Sometimes she has her head a certain way and it will trigger. No radiation down her arm, numbness/tingling, or weakness.    Joined a [employer]. Is in a choir. Connected to other friends from previous locations virtually. Continues to build her social [place] here.      Objective    /71   Pulse 70   Temp 98  F (36.7  C)   Resp 16   Ht 1.61 m (5' 3.39\")   Wt 68 kg (150 lb)   SpO2 98%   BMI 26.25 kg/m    Body mass index is 26.25 kg/m .  Physical Exam   GENERAL: alert and no distress  EYES: Eyes grossly normal to inspection, conjunctivae and sclerae " normal  NECK: no adenopathy, no asymmetry, masses, or scars, tenderness to palpation over SCM insertion site in occipital region on the left with tenderness along SCM down the neck and upper back  RESP: normal rate and effort  NEURO: Gross motor normal  PSYCH: mentation appears normal, affect normal/bright      Signed Electronically by: Josselyn Ordonez MD

## 2024-04-22 NOTE — PATIENT INSTRUCTIONS
- Work on increasing your fluid intake  - Continue with pelvic floor PT  - Continue moving your body regularly! Keep going with the healthy eating changes  - Good luck with your travel. I refilled your zolpidem   - Follow up this fall to check back on your A1c

## 2024-04-29 ENCOUNTER — THERAPY VISIT (OUTPATIENT)
Dept: PHYSICAL THERAPY | Facility: REHABILITATION | Age: 69
End: 2024-04-29
Payer: MEDICARE

## 2024-04-29 DIAGNOSIS — N39.3 FEMALE STRESS INCONTINENCE: ICD-10-CM

## 2024-04-29 DIAGNOSIS — K59.00 CONSTIPATION, UNSPECIFIED CONSTIPATION TYPE: Primary | ICD-10-CM

## 2024-04-29 PROCEDURE — 97112 NEUROMUSCULAR REEDUCATION: CPT | Mod: GP

## 2024-04-29 PROCEDURE — 97110 THERAPEUTIC EXERCISES: CPT | Mod: GP

## 2024-07-18 ENCOUNTER — OFFICE VISIT (OUTPATIENT)
Dept: FAMILY MEDICINE | Facility: CLINIC | Age: 69
End: 2024-07-18
Payer: MEDICARE

## 2024-07-18 VITALS
HEART RATE: 85 BPM | DIASTOLIC BLOOD PRESSURE: 81 MMHG | OXYGEN SATURATION: 98 % | RESPIRATION RATE: 18 BRPM | TEMPERATURE: 99.1 F | WEIGHT: 150.3 LBS | BODY MASS INDEX: 26.3 KG/M2 | SYSTOLIC BLOOD PRESSURE: 141 MMHG

## 2024-07-18 DIAGNOSIS — U07.1 INFECTION DUE TO 2019 NOVEL CORONAVIRUS: Primary | ICD-10-CM

## 2024-07-18 PROCEDURE — 99213 OFFICE O/P EST LOW 20 MIN: CPT | Performed by: PHYSICIAN ASSISTANT

## 2024-07-18 NOTE — PROGRESS NOTES
Assessment & Plan     Infection due to 2019 novel coronavirus  Patient has positive home COVID test, is a candidate for paxlovid. Advised to hold all supplements, hold zolpidem, hold trazodone, she may experience HRT symptoms while on Paxlovid. She had a normal GFR in 2022. Patient understands, accepts the risks and is amenable to plan.    - nirmatrelvir and ritonavir (PAXLOVID) 300 mg/100 mg therapy pack; Take 3 tablets by mouth 2 times daily for 5 days          Subjective   Tammy is a 69 year old, presenting for the following health issues: Positive COVID test at home today. Symptoms started last evening with feeling run down, today had body aches, fatigue and scratchy throat. Took test and was positive. She has had COVID before, a few years ago, received Paxlovid and did well, and is interested in getting it today.           4/22/2024     1:58 PM   Additional Questions   Roomed by Geeta'     [place]       Review of Systems  Constitutional, HEENT, cardiovascular, pulmonary, gi and gu systems are negative, except as otherwise noted.      Objective    BP (!) 141/81 (BP Location: Right arm, Patient Position: Sitting, Cuff Size: Adult Regular)   Pulse 85   Temp 99.1  F (37.3  C) (Oral)   Resp 18   Wt 68.2 kg (150 lb 4.8 oz)   SpO2 98%   BMI 26.30 kg/m    Body mass index is 26.3 kg/m .  Physical Exam   Exam shortened to decrease exposure to provider  GENERAL: alert and no distress  HENT: normal cephalic/atraumatic  RESP: sitting, breathing comfortably without accessory muscles  SKIN: no suspicious lesions or rashes  PSYCH: mentation appears normal, affect normal/bright    No results found for this or any previous visit (from the past 24 hour(s)).        Signed Electronically by: Mandi Peña PA-C

## 2024-07-29 ENCOUNTER — TELEPHONE (OUTPATIENT)
Dept: FAMILY MEDICINE | Facility: CLINIC | Age: 69
End: 2024-07-29

## 2024-07-29 NOTE — TELEPHONE ENCOUNTER
Patient left voicemail requesting if she can go on paxlovid again, she is covid + today and she stated she just tested positive 7/18/24 as well. Please advise further thank you.

## 2024-07-29 NOTE — TELEPHONE ENCOUNTER
Writer discussed with , preceptor for the day, regarding second prescription for paxlovid. Not indicated, writer was advised likely rebound symptoms. Writer called patient and advised of home cares and monitor for worsening of symptoms. Runny nose at this time. Advised if experiencing respiratory symptoms, come in to be seen so we can treat symptoms. Tammy verbalized understanding and agreeable to the plan. Mary SANFORD

## 2024-08-05 ENCOUNTER — THERAPY VISIT (OUTPATIENT)
Dept: PHYSICAL THERAPY | Facility: REHABILITATION | Age: 69
End: 2024-08-05
Payer: MEDICARE

## 2024-08-05 DIAGNOSIS — K59.00 CONSTIPATION, UNSPECIFIED CONSTIPATION TYPE: Primary | ICD-10-CM

## 2024-08-05 DIAGNOSIS — S82.839D: ICD-10-CM

## 2024-08-05 DIAGNOSIS — N39.3 FEMALE STRESS INCONTINENCE: ICD-10-CM

## 2024-08-05 PROCEDURE — 97140 MANUAL THERAPY 1/> REGIONS: CPT | Mod: GP | Performed by: PHYSICAL THERAPIST

## 2024-08-16 DIAGNOSIS — G47.00 INSOMNIA, UNSPECIFIED TYPE: ICD-10-CM

## 2024-08-19 RX ORDER — TRAZODONE HYDROCHLORIDE 50 MG/1
50 TABLET, FILM COATED ORAL AT BEDTIME
Qty: 90 TABLET | Refills: 1 | Status: SHIPPED | OUTPATIENT
Start: 2024-08-19

## 2024-09-09 ENCOUNTER — MYC MEDICAL ADVICE (OUTPATIENT)
Dept: FAMILY MEDICINE | Facility: CLINIC | Age: 69
End: 2024-09-09
Payer: MEDICARE

## 2024-09-09 DIAGNOSIS — N95.1 VAGINAL DRYNESS, MENOPAUSAL: ICD-10-CM

## 2024-09-09 DIAGNOSIS — G47.00 INSOMNIA, UNSPECIFIED TYPE: ICD-10-CM

## 2024-09-09 RX ORDER — ESTRADIOL 0.1 MG/G
2 CREAM VAGINAL
Qty: 42.5 G | Refills: 4 | Status: SHIPPED | OUTPATIENT
Start: 2024-09-09

## 2024-09-09 RX ORDER — ZOLPIDEM TARTRATE 5 MG/1
2.5 TABLET ORAL
Qty: 30 TABLET | Refills: 1 | Status: SHIPPED | OUTPATIENT
Start: 2024-09-09

## 2024-10-06 ENCOUNTER — HEALTH MAINTENANCE LETTER (OUTPATIENT)
Age: 69
End: 2024-10-06

## 2024-10-09 SDOH — HEALTH STABILITY: PHYSICAL HEALTH: ON AVERAGE, HOW MANY DAYS PER WEEK DO YOU ENGAGE IN MODERATE TO STRENUOUS EXERCISE (LIKE A BRISK WALK)?: 3 DAYS

## 2024-10-09 SDOH — HEALTH STABILITY: PHYSICAL HEALTH: ON AVERAGE, HOW MANY MINUTES DO YOU ENGAGE IN EXERCISE AT THIS LEVEL?: 30 MIN

## 2024-10-09 ASSESSMENT — SOCIAL DETERMINANTS OF HEALTH (SDOH): HOW OFTEN DO YOU GET TOGETHER WITH FRIENDS OR RELATIVES?: MORE THAN THREE TIMES A WEEK

## 2024-10-14 ENCOUNTER — OFFICE VISIT (OUTPATIENT)
Dept: FAMILY MEDICINE | Facility: CLINIC | Age: 69
End: 2024-10-14
Payer: MEDICARE

## 2024-10-14 VITALS
HEART RATE: 71 BPM | TEMPERATURE: 98.1 F | OXYGEN SATURATION: 98 % | BODY MASS INDEX: 26.9 KG/M2 | SYSTOLIC BLOOD PRESSURE: 111 MMHG | RESPIRATION RATE: 22 BRPM | DIASTOLIC BLOOD PRESSURE: 71 MMHG | WEIGHT: 151.8 LBS | HEIGHT: 63 IN

## 2024-10-14 DIAGNOSIS — Z23 INFLUENZA VACCINE NEEDED: ICD-10-CM

## 2024-10-14 DIAGNOSIS — R73.03 PREDIABETES: ICD-10-CM

## 2024-10-14 DIAGNOSIS — N39.3 FEMALE STRESS INCONTINENCE: ICD-10-CM

## 2024-10-14 DIAGNOSIS — Z00.00 ENCOUNTER FOR MEDICARE ANNUAL WELLNESS EXAM: Primary | ICD-10-CM

## 2024-10-14 LAB
EST. AVERAGE GLUCOSE BLD GHB EST-MCNC: 120 MG/DL
HBA1C MFR BLD: 5.8 % (ref 0–5.6)

## 2024-10-14 PROCEDURE — 36415 COLL VENOUS BLD VENIPUNCTURE: CPT | Performed by: FAMILY MEDICINE

## 2024-10-14 PROCEDURE — G0439 PPPS, SUBSEQ VISIT: HCPCS | Performed by: FAMILY MEDICINE

## 2024-10-14 PROCEDURE — G0008 ADMIN INFLUENZA VIRUS VAC: HCPCS | Performed by: FAMILY MEDICINE

## 2024-10-14 PROCEDURE — 83036 HEMOGLOBIN GLYCOSYLATED A1C: CPT | Performed by: FAMILY MEDICINE

## 2024-10-14 PROCEDURE — 90662 IIV NO PRSV INCREASED AG IM: CPT | Performed by: FAMILY MEDICINE

## 2024-10-14 NOTE — PROGRESS NOTES
Prior to immunization administration, verified patients identity using patient s name and date of birth. Please see Immunization Activity for additional information.     Screening Questionnaire for Adult Immunization    Are you sick today?   No   Do you have allergies to medications, food, a vaccine component or latex?   No   Have you ever had a serious reaction after receiving a vaccination?   No   Do you have a long-term health problem with heart, lung, kidney, or metabolic disease (e.g., diabetes), asthma, a blood disorder, no spleen, complement component deficiency, a cochlear implant, or a spinal fluid leak?  Are you on long-term aspirin therapy?   No   Do you have cancer, leukemia, HIV/AIDS, or any other immune system problem?   No   Do you have a parent, brother, or sister with an immune system problem?   No   In the past 3 months, have you taken medications that affect  your immune system, such as prednisone, other steroids, or anticancer drugs; drugs for the treatment of rheumatoid arthritis, Crohn s disease, or psoriasis; or have you had radiation treatments?   No   Have you had a seizure, or a brain or other nervous system problem?   No   During the past year, have you received a transfusion of blood or blood    products, or been given immune (gamma) globulin or antiviral drug?   No   For women: Are you pregnant or is there a chance you could become       pregnant during the next month?   No   Have you received any vaccinations in the past 4 weeks?   No     Immunization questionnaire answers were all negative.      Patient instructed to remain in clinic for 15 minutes afterwards, and to report any adverse reactions.     Screening performed by Edgar Kee CMA on 10/14/2024 at 9:24 AM.

## 2024-10-14 NOTE — PATIENT INSTRUCTIONS
- Get your COVID shot in the next few weeks. You can come back here or go to a pharmacy.     - Keep up the great work with your healthy lifestyle efforts!    Patient Education   Preventive Care Advice   This is general advice given by our system to help you stay healthy. However, your care team may have specific advice just for you. Please talk to your care team about your preventive care needs.  Nutrition  Eat 5 or more servings of fruits and vegetables each day.  Try wheat bread, brown rice and whole grain pasta (instead of white bread, rice, and pasta).  Get enough calcium and vitamin D. Check the label on foods and aim for 100% of the RDA (recommended daily allowance).  Lifestyle  Exercise at least 150 minutes each week  (30 minutes a day, 5 days a week).  Do muscle strengthening activities 2 days a week. These help control your weight and prevent disease.  No smoking.  Wear sunscreen to prevent skin cancer.  Have a dental exam and cleaning every 6 months.  Yearly exams  See your health care team every year to talk about:  Any changes in your health.  Any medicines your care team has prescribed.  Preventive care, family planning, and ways to prevent chronic diseases.  Shots (vaccines)   COVID-19 shot: Get this shot when it's due.  Flu shot: Get a flu shot every year.  Tetanus shot: Get a tetanus shot every 10 years.  Pneumococcal, hepatitis A, and RSV shots: Ask your care team if you need these based on your risk.  Shingles shot (for age 50 and up)  General health tests  Cholesterol test: At age 39, start having a cholesterol test every 5 years, or more often if advised.  Bone density scan (DEXA): At age 50, ask your care team if you should have this scan for osteoporosis (brittle bones).  Hepatitis C: Get tested at least once in your life.  STIs (sexually transmitted infections)  Before age 24: Ask your care team if you should be screened for STIs.  After age 24: Get screened for STIs if you're at risk. You are  at risk for STIs (including HIV) if:  You are sexually active with more than one person.  You don't use condoms every time.  You or a partner was diagnosed with a sexually transmitted infection.  If you are at risk for HIV, ask about PrEP medicine to prevent HIV.  Get tested for HIV at least once in your life, whether you are at risk for HIV or not.  Cancer screening tests  Cervical cancer screening: If you have a cervix, begin getting regular cervical cancer screening tests starting at age 21.  Breast cancer scan (mammogram): If you've ever had breasts, begin having regular mammograms starting at age 40. This is a scan to check for breast cancer.  Colon cancer screening: It is important to start screening for colon cancer at age 45.  Have a colonoscopy test every 10 years (or more often if you're at risk) Or, ask your provider about stool tests like a FIT test every year or Cologuard test every 3 years.  To learn more about your testing options, visit:   .  For help making a decision, visit:   https://bit.ly/qi14577.  Prostate cancer screening test: If you have a prostate, ask your care team if a prostate cancer screening test (PSA) at age 55 is right for you.  Lung cancer screening: If you are a current or former smoker ages 50 to 80, ask your care team if ongoing lung cancer screenings are right for you.  For informational purposes only. Not to replace the advice of your health care provider. Copyright   2023 [facility]. All rights reserved. Clinically reviewed by the [facility]. [employer] 808417 - REV 01/24.  Hearing Loss: Care Instructions  Overview     Hearing loss is a sudden or slow decrease in how well you hear. It can range from slight to profound. Permanent hearing loss can occur with aging. It also can happen when you are exposed long-term to loud noise. Examples include listening to loud music, riding motorcycles, or being around other loud machines.  Hearing  loss can affect your work and home life. It can make you feel lonely or depressed. You may feel that you have lost your independence. But hearing aids and other devices can help you hear better and feel connected to others.  Follow-up care is a key part of your treatment and safety. Be sure to make and go to all appointments, and call your doctor if you are having problems. It's also a good idea to know your test results and keep a list of the medicines you take.  How can you care for yourself at home?  Avoid loud noises whenever possible. This helps keep your hearing from getting worse.  Always wear hearing protection around loud noises.  Wear a hearing aid as directed.  A professional can help you pick a hearing aid that will work best for you.  You can also get hearing aids over the counter for mild to moderate hearing loss.  Have hearing tests as your doctor suggests. They can show whether your hearing has changed. Your hearing aid may need to be adjusted.  Use other devices as needed. These may include:  Telephone amplifiers and hearing aids that can connect to a television, stereo, radio, or microphone.  Devices that use lights or vibrations. These alert you to the doorbell, a ringing telephone, or a baby monitor.  Television closed-captioning. This shows the words at the bottom of the screen. Most new TVs can do this.  TTY (text telephone). This lets you type messages back and forth on the telephone instead of talking or listening. These devices are also called TDD. When messages are typed on the keyboard, they are sent over the phone line to a receiving TTY. The message is shown on a monitor.  Use text messaging, social media, and email if it is hard for you to communicate by telephone.  Try to learn a listening technique called speechreading. It is not lipreading. You pay attention to people's gestures, expressions, posture, and tone of voice. These clues can help you understand what a person is saying.  "Face the person you are talking to, and have them face you. Make sure the lighting is good. You need to see the other person's face clearly.  Think about counseling if you need help to adjust to your hearing loss.  When should you call for help?  Watch closely for changes in your health, and be sure to contact your doctor if:    You think your hearing is getting worse.     You have new symptoms, such as dizziness or nausea.   Where can you learn more?  Go to https://www.[employer].net/patiented  Enter R798 in the search box to learn more about \"Hearing Loss: Care Instructions.\"  Current as of: September 27, 2023  Content Version: 14.2 [address] [facility] [employer].   Care instructions adapted under license by your healthcare professional. If you have questions about a medical condition or this instruction, always ask your healthcare professional. [employer] disclaims any warranty or liability for your use of this information.       "

## 2024-10-14 NOTE — PROGRESS NOTES
"Preventive Care Visit  [facility]  April JASMINDomenica Ordonez MD, Family Medicine  Oct 14, 2024      Assessment & Plan   Problem List Items Addressed This Visit          Endocrine    Prediabetes    Relevant Orders    Hemoglobin A1c (Completed)       Urinary    Female stress incontinence     Other Visit Diagnoses       Encounter for Medicare annual wellness exam    -  Primary    Influenza vaccine needed        Relevant Orders    INFLUENZA HIGH DOSE, TRIVALENT, PF (FLUZONE) (Completed)        Pre-Diabetes  Patient has a history of pre-diabetes. Last hemoglobin A1c was 5.7% last year. She has been working on healthy lifestyles changes - limiting carbs and sugars, Weight Watchers, walking 30 minutes 3-5 times per week. Taking Metformin 850 mg daily.  -Recheck A1c today  - Continued work on healthy lifestyle changes:  increase exercise to 5 times per week, plenty of healthy fats and protein    Female stress incontinence  Doing much better after pelvic floor PT. Only one urination episode per night! Constipation has also improved.  - Continue pelvic floor PT work.    BMI  Estimated body mass index is 26.56 kg/m  as calculated from the following:    Height as of this encounter: 1.61 m (5' 3.39\").    Weight as of this encounter: 68.9 kg (151 lb 12.8 oz).   Weight management plan: Discussed healthy diet and exercise guidelines    Immunizations  -Flu immunization provided today  -No COVID immunizations available until 10/16/2024. Patient is 3 months out from previous COVID infection. Recommended patient follow up with our clinic or a community pharmacy next week to receive COVID vaccine.    Screenings  -Mammogram due 11/2024    Counseling  Appropriate preventive services were addressed with this patient via screening, questionnaire, or discussion as appropriate for fall prevention, nutrition, physical activity, Tobacco-use cessation, social engagement, weight loss and cognition.  Checklist reviewing " preventive services available has been given to the patient.  Reviewed patient's diet, addressing concerns and/or questions.   She is at risk for lack of exercise and has been provided with information to increase physical activity for the benefit of her well-being.   She is at risk for psychosocial distress and has been provided with information to reduce risk.   The patient was provided with written information regarding signs of hearing loss.     Follow up in 12 months to next annual wellness visit, or sooner with other concerns        Matt Munoz is a 69 year old, presenting for the following:  Wellness Visit (65+ annual wellness )        10/14/2024     8:24 AM   Additional Questions   Roomed by Edgar         10/14/2024    Information    services provided? No    No       Multiple values from one day are sorted in reverse-chronological order         Health Care Directive  Patient does not have a Health Care Directive or Living Will: Discussed advance care planning with patient; information given to patient to review.    MIGNON Munoz is a 69 year old female who presents today for her annual wellness visit. She has a diagnosis of pre-diabetes and since her last visit she has been working on healthy lifestyle changes. She has been working to eat less carbs and sugars and previously had some weight loss success while using Weight Watchers. The unexpected loss of her sister to lung cancer in July caused her to pause her Weight Watchers plan. She has been continuing to watch her diet throughout this time and is ready to restart her Weight Watchers plan. She and her  also use a meal plan service to help with healthy cooking 4 nights a week. She has also been working to increase her activity levels each week. On average, she walks for 30 minutes 3 times per week, sometimes up to 5-6 times per week. She has been taking her metformin daily as prescribed. No GI symptoms at this time.    She  has a history of stress incontinence for which she was been working with pelvic floor physical therapy. She states this has been very helpful. She used to wake up 2-3 times per night to urinate, now she only wakes up 1 time per night. Denies urinary incontinence.     Notes her pelvic floor physical therapy has been helpful for her constipation as well. Has been taking magnesium citrate which has also been helpful.    Patient had COVID on July 18th, 2024. She took paxlovid within a day of having symptoms. Experienced rebound. 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 Interested in flu shot today as well.        10/9/2024   General Health   How would you rate your overall physical health? Good   Feel stress (tense, anxious, or unable to sleep) Only a little      (!) STRESS CONCERN      10/9/2024   Nutrition   Diet: Carbohydrate counting           10/9/2024   Exercise   Days per week of moderate/strenous exercise 3 days  -walks around the park for a couple miles 3x/week   Average minutes spent exercising at this level 30 min            10/9/2024   Social Factors   Frequency of gathering with friends or relatives More than three times a week   Worry food won't last until get money to buy more No   Food not last or not have enough money for food? No   Do you have housing? (Housing is defined as stable permanent housing and does not include staying ouside in a car, in a tent, in an abandoned building, in an overnight shelter, or couch-surfing.) Yes   Are you worried about losing your housing? No   Lack of transportation? No   Unable to get utilities (heat,electricity)? No            10/9/2024   Fall Risk   Fallen 2 or more times in the past year? No   Trouble with walking or balance? No             10/9/2024   Activities of Daily Living- Home Safety   Needs help with the following daily activites None of the above   Safety  concerns in the home None of the above            10/9/2024   Dental   Dentist two times every year? Yes            10/9/2024   Hearing Screening   Hearing concerns? (!) TROUBLE UNDERSTANDING SOFT OR WHISPERED SPEECH.   Saw audiology about this. Some age-appropriate hearing loss at high ranges, otherwise no concerns        10/9/2024   Driving Risk Screening   Patient/family members have concerns about driving No            10/9/2024   General Alertness/Fatigue Screening   Have you been more tired than usual lately? No            10/9/2024   Urinary Incontinence Screening   Bothered by leaking urine in past 6 months No            10/9/2024   TB Screening   Were you born outside of the [place]? Yes            Today's PHQ-2 Score:       10/14/2024     8:17 AM   PHQ-2 ( 1999 Pfizer)   Q1: Little interest or pleasure in doing things 1   Q2: Feeling down, depressed or hopeless 1   PHQ-2 Score 2   Q1: Little interest or pleasure in doing things Several days   Q2: Feeling down, depressed or hopeless Several days   PHQ-2 Score 2           10/9/2024   Substance Use   Alcohol more than 3/day or more than 7/wk No   Do you have a current opioid prescription? No   How severe/bad is pain from 1 to 10? 0/10 (No Pain)   Do you use any other substances recreationally? No        Social History     Tobacco Use    Smoking status: Never    Smokeless tobacco: Never   Vaping Use    Vaping status: Never Used   Substance Use Topics    Alcohol use: Not Currently    Drug use: Not Currently           11/29/2023   LAST FHS-7 RESULTS   1st degree relative breast or ovarian cancer No   Any relative bilateral breast cancer No   Any male have breast cancer No   Any ONE woman have BOTH breast AND ovarian cancer No   Any woman with breast cancer before 50yrs No   2 or more relatives with breast AND/OR ovarian cancer No   2 or more relatives with breast AND/OR bowel cancer No           Mammogram Screening - Mammogram every 1-2 years updated in Health  Maintenance based on mutual decision making. Last mammogram was 11/2023 and was normal. Planning to schedule her yearly mammogram this November.    Last pap smear was 10/17/2023 with normal findings. This marked her last pap smear screening as she is now above the age of 65 yo.        Latest Ref Rng & Units 10/17/2023     2:22 PM   PAP / HPV   PAP  Negative for Intraepithelial Lesion or Malignancy (NILM)    HPV 16 DNA Negative Negative    HPV 18 DNA Negative Negative    Other HR HPV Negative Negative      ASCVD Risk   The 10-year ASCVD risk score (Luke MCCRACKEN, et al., 2019) is: 5.2%    Values used to calculate the score:      Age: 69 years      Sex: Female      Is Non- : No      Diabetic: No      Tobacco smoker: No      Systolic Blood Pressure: 111 mmHg      Is BP treated: No      HDL Cholesterol: 76 mg/dL      Total Cholesterol: 187 mg/dL        Reviewed and updated as needed this visit by Provider   Tobacco  Allergies  Meds  Problems  Med Hx  Surg Hx  Fam Hx            Past Medical History:   Diagnosis Date    History of actinic keratoses 11/14/2022    Previously treated with Efudex    Insomnia 7/27/2010    Prediabetes 8/12/2022    A1c 5.8 in August 2022    Sleep apnea 4/27/1997    Wears mandibular appliance     Current providers sharing in care for this patient include:  Patient Care Team:  Josselyn Ordonez MD as PCP - General (Family Medicine)  Ghassan Whatley MD as MD (Dermatology)  Ruth Hutchins MD as MD (Dermatology)  Josselyn Ordonez MD as Assigned PCP  Royce Maier RPH as Assigned MTM Pharmacist  Marlen Blanchard PA-C as Physician Assistant (Dermatology)  Ruth Hutchins MD as Assigned Surgical Provider  Janis Santos RPH as Pharmacist (Pharmacist)    The following health maintenance items are reviewed in Epic and correct as of today:  Health Maintenance   Topic Date Due    COVID-19 Vaccine (6 - 2024-25 season)  "09/01/2024    GLUCOSE  02/23/2025    MEDICARE ANNUAL WELLNESS VISIT  10/14/2025    FALL RISK ASSESSMENT  10/14/2025    MAMMO SCREENING  11/29/2025    COLORECTAL CANCER SCREENING  06/20/2026    DEXA  11/29/2026    LIPID  02/23/2027    ADVANCE CARE PLANNING  10/14/2029    DTAP/TDAP/TD IMMUNIZATION (2 - Td or Tdap) 07/06/2030    RSV VACCINE (1 - 1-dose 75+ series) 03/13/2031    HEPATITIS C SCREENING  Completed    PHQ-2 (once per calendar year)  Completed    INFLUENZA VACCINE  Completed    Pneumococcal Vaccine: 65+ Years  Completed    ZOSTER IMMUNIZATION  Completed    HPV IMMUNIZATION  Aged Out    MENINGITIS IMMUNIZATION  Aged Out    RSV MONOCLONAL ANTIBODY  Aged Out         Review of Systems  Constitutional, HEENT, cardiovascular, pulmonary, gi and gu systems are negative, except as otherwise noted.     Objective    Exam  /71   Pulse 71   Temp 98.1  F (36.7  C) (Tympanic)   Resp 22   Ht 1.61 m (5' 3.39\")   Wt 68.9 kg (151 lb 12.8 oz)   SpO2 98%   BMI 26.56 kg/m     Estimated body mass index is 26.56 kg/m  as calculated from the following:    Height as of this encounter: 1.61 m (5' 3.39\").    Weight as of this encounter: 68.9 kg (151 lb 12.8 oz).    Physical Exam  GENERAL: alert and no distress  RESP: lungs clear to auscultation - no rales, rhonchi or wheezes  CV: regular rate and rhythm, normal S1 S2, no S3 or S4, no murmur, click or rub  PSYCH: mentation within normal limits, normal mood/affect        10/14/2024   Mini Cog   Clock Draw Score 2 Normal   3 Item Recall 3 objects recalled   Mini Cog Total Score 5             Deana Zavaleta, MS3    Physician Attestation   IJosselyn MD, was present with the medical/CAREN student who participated in the service and in the documentation of the note.  I have verified the history and personally performed the physical exam and medical decision making.  I agree with the assessment and plan of care as documented in the note.      Items personally reviewed: " vitals.    Josselyn Ordonez MD    Signed Electronically by: Josselyn Ordonez MD

## 2024-10-16 ENCOUNTER — OFFICE VISIT (OUTPATIENT)
Dept: DERMATOLOGY | Facility: CLINIC | Age: 69
End: 2024-10-16
Attending: PHYSICIAN ASSISTANT
Payer: MEDICARE

## 2024-10-16 DIAGNOSIS — D18.01 CHERRY ANGIOMA: ICD-10-CM

## 2024-10-16 DIAGNOSIS — L82.1 SEBORRHEIC KERATOSES: Primary | ICD-10-CM

## 2024-10-16 DIAGNOSIS — L81.4 LENTIGINES: ICD-10-CM

## 2024-10-16 DIAGNOSIS — D22.9 MULTIPLE BENIGN NEVI: ICD-10-CM

## 2024-10-16 PROCEDURE — G2211 COMPLEX E/M VISIT ADD ON: HCPCS | Performed by: PHYSICIAN ASSISTANT

## 2024-10-16 PROCEDURE — 99213 OFFICE O/P EST LOW 20 MIN: CPT | Performed by: PHYSICIAN ASSISTANT

## 2024-10-16 NOTE — PROGRESS NOTES
Tammy Dennis is a pleasant 69 year old year old female patient here today for skin check. She denies any new or changing nevi. No painful or bleeding skin lesions.  Patient has no other skin complaints today.  Remainder of the HPI, Meds, PMH, Allergies, FH, and SH was reviewed in chart.    Pertinent Hx:   History of AKs, treated with cryo and efudex   Past Medical History:   Diagnosis Date    History of actinic keratoses 11/14/2022    Previously treated with Efudex    Insomnia 7/27/2010    Prediabetes 8/12/2022    A1c 5.8 in August 2022    Sleep apnea 4/27/1997    Wears mandibular appliance       Past Surgical History:   Procedure Laterality Date    FOOT SURGERY Right 2018    2 plates and 12 screws placed, fracture        Family History   Problem Relation Age of Onset    Melanoma No family hx of     Skin Cancer No family hx of        Social History     Socioeconomic History    Marital status:      Spouse name: Not on file    Number of children: Not on file    Years of education: Not on file    Highest education level: Not on file   Occupational History    Not on file   Tobacco Use    Smoking status: Never    Smokeless tobacco: Never   Vaping Use    Vaping status: Never Used   Substance and Sexual Activity    Alcohol use: Not Currently    Drug use: Not Currently    Sexual activity: Not on file   Other Topics Concern    Not on file   Social History Narrative    8.3.22    Children:     Becca 37    Rahat 35    Monse 33        Retired    Recently moved back to [place]    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     Social Determinants of Health     Financial Resource Strain: Low Risk  (10/9/2024)    Financial Resource Strain     Within the past 12 months, have you or your family members you live with been unable to get utilities (heat, electricity) when it was really needed?: No   Food Insecurity: Low Risk  (10/9/2024)    Food  Insecurity     Within the past 12 months, did you worry that your food would run out before you got money to buy more?: No     Within the past 12 months, did the food you bought just not last and you didn t have money to get more?: No   Transportation Needs: Low Risk  (10/9/2024)    Transportation Needs     Within the past 12 months, has lack of transportation kept you from medical appointments, getting your medicines, non-medical meetings or appointments, work, or from getting things that you need?: No   Physical Activity: Insufficiently Active (10/9/2024)    Exercise Vital Sign     Days of Exercise per Week: 3 days     Minutes of Exercise per Session: 30 min   Stress: No Stress Concern Present (10/9/2024)    [employer] [place] [employer] - Occupational Stress Questionnaire     Feeling of Stress : Only a little   Social Connections: Unknown (10/9/2024)    Social Connection and Isolation Panel [NHANES]     Frequency of Communication with Friends and Family: Not on file     Frequency of Social Gatherings with Friends and Family: More than three times a week     Attends [employer] Services: Not on file     Active Member of Clubs or Organizations: Not on file     Attends Club or Organization Meetings: Not on file     Marital Status: Not on file   Interpersonal Safety: Low Risk  (10/14/2024)    Interpersonal Safety     Do you feel physically and emotionally safe where you currently live?: Yes     Within the past 12 months, have you been hit, slapped, kicked or otherwise physically hurt by someone?: No     Within the past 12 months, have you been humiliated or emotionally abused in other ways by your partner or ex-partner?: No   Housing Stability: Low Risk  (10/9/2024)    Housing Stability     Do you have housing? : Yes     Are you worried about losing your housing?: No       Outpatient Encounter Medications as of 10/16/2024   Medication Sig Dispense Refill    ascorbic acid (VITAMIN C) 250 MG CHEW chewable  tablet Take 250 mg by mouth daily      Black Cohosh 450 MG CAPS Take 450 mg by mouth daily      Calcium Carb-Cholecalciferol (CALCIUM 500 + D PO) Take 500 mg by mouth daily      cyanocobalamin (VITAMIN B-12) 1000 MCG tablet Take 1,000 mcg by mouth daily      docusate sodium (COLACE) 100 MG capsule Take 200 mg by mouth daily      EPINEPHrine (ANY BX GENERIC EQUIV) 0.3 MG/0.3ML injection 2-pack Inject 0.3 mLs (0.3 mg) into the muscle as needed for anaphylaxis 2 each 3    estradiol (ESTRACE) 0.1 MG/GM vaginal cream Place 2 g vaginally twice a week. 42.5 g 4    magnesium citrate solution (NOT currently available) Take 148 mLs by mouth once      Melatonin 10 MG TABS tablet Take 10 mg by mouth At Bedtime      metFORMIN (GLUCOPHAGE) 850 MG tablet Take 1 tablet (850 mg) by mouth daily (with dinner) 90 tablet 3    Psyllium-Calcium (METAMUCIL PLUS CALCIUM) CAPS Take 2 capsules by mouth daily      traZODone (DESYREL) 50 MG tablet Take 1 tablet (50 mg) by mouth at bedtime 90 tablet 1    vitamin D3 (CHOLECALCIFEROL) 50 mcg (2000 units) tablet Take 1 tablet by mouth daily      zolpidem (AMBIEN) 5 MG tablet Take 0.5 tablets (2.5 mg) by mouth nightly as needed for sleep. 30 tablet 1     No facility-administered encounter medications on file as of 10/16/2024.             O:   NAD, WDWN, Alert & Oriented, Mood & Affect wnl, Vitals stable   Here today alone   There were no vitals taken for this visit.   General appearance normal   Vitals stable   Alert, oriented and in no acute distress      Brown stuck stuck papules, macules, with facial telangiectasia   Stuck on papules and brown macules on trunk and ext   Red papules on trunk  Brown papules and macule with regular pigment network and borders on torso and extremities     The remainder of skin exam is normal       Eyes: Conjunctivae/lids:Normal     ENT: Lips, mucosa: normal    MSK:Normal    Cardiovascular: peripheral edema none    Pulm: Breathing Normal    Neuro/Psych:  Orientation:Alert and Orientedx3 ; Mood/Affect:normal     A/P:  1. Seborrheic keratosis, lentigo, angioma, benign nevi   It was a pleasure speaking to Tammy Dennis today.  BENIGN LESIONS DISCUSSED WITH PATIENT:  I discussed the specifics of tumor, prognosis, and genetics of benign lesions.  I explained that treatment of these lesions would be purely cosmetic and not medically neccessary.  I discussed with patient different removal options including excision, cautery and /or laser.      Nature and genetics of benign skin lesions dicussed with patient.  Signs and Symptoms of skin cancer discussed with patient.  ABCDEs of melanoma reviewed with patient.  Patient encouraged to perform monthly skin exams.  UV precautions reviewed with patient.  Risks of non-melanoma skin cancer discussed with patient   Return to clinic in one year or sooner if needed.

## 2024-10-16 NOTE — LETTER
10/16/2024      Tammy Dennis  [address] [place] [address]      Dear Colleague,    Thank you for referring your patient, Tammy Dennis, to the [facility]. Please see a copy of my visit note below.    Tammy Dennis is a pleasant 69 year old year old female patient here today for skin check. She denies any new or changing nevi. No painful or bleeding skin lesions.  Patient has no other skin complaints today.  Remainder of the HPI, Meds, PMH, Allergies, FH, and SH was reviewed in chart.    Pertinent Hx:   History of AKs, treated with cryo and efudex   Past Medical History:   Diagnosis Date     History of actinic keratoses 11/14/2022    Previously treated with Efudex     Insomnia 7/27/2010     Prediabetes 8/12/2022    A1c 5.8 in August 2022     Sleep apnea 4/27/1997    Wears mandibular appliance       Past Surgical History:   Procedure Laterality Date     FOOT SURGERY Right 2018    2 plates and 12 screws placed, fracture        Family History   Problem Relation Age of Onset     Melanoma No family hx of      Skin Cancer No family hx of        Social History     Socioeconomic History     Marital status:      Spouse name: Not on file     Number of children: Not on file     Years of education: Not on file     Highest education level: Not on file   Occupational History     Not on file   Tobacco Use     Smoking status: Never     Smokeless tobacco: Never   Vaping Use     Vaping status: Never Used   Substance and Sexual Activity     Alcohol use: Not Currently     Drug use: Not Currently     Sexual activity: Not on file   Other Topics Concern     Not on file   Social History Narrative    8.3.22    Children:     Becca 37    Rahat 35    Monse 33        Retired    Recently moved back to [place]    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 at a food [employer]     Social Determinants of Health     Financial  Resource Strain: Low Risk  (10/9/2024)    Financial Resource Strain      Within the past 12 months, have you or your family members you live with been unable to get utilities (heat, electricity) when it was really needed?: No   Food Insecurity: Low Risk  (10/9/2024)    Food Insecurity      Within the past 12 months, did you worry that your food would run out before you got money to buy more?: No      Within the past 12 months, did the food you bought just not last and you didn t have money to get more?: No   Transportation Needs: Low Risk  (10/9/2024)    Transportation Needs      Within the past 12 months, has lack of transportation kept you from medical appointments, getting your medicines, non-medical meetings or appointments, work, or from getting things that you need?: No   Physical Activity: Insufficiently Active (10/9/2024)    Exercise Vital Sign      Days of Exercise per Week: 3 days      Minutes of Exercise per Session: 30 min   Stress: No Stress Concern Present (10/9/2024)    [place] [place] [employer] Occupational Health - Occupational Stress Questionnaire      Feeling of Stress : Only a little   Social Connections: Unknown (10/9/2024)    Social Connection and Isolation Panel [NHANES]      Frequency of Communication with Friends and Family: Not on file      Frequency of Social Gatherings with Friends and Family: More than three times a week      Attends Anglican Services: Not on file      Active Member of Clubs or Organizations: Not on file      Attends Club or Organization Meetings: Not on file      Marital Status: Not on file   Interpersonal Safety: Low Risk  (10/14/2024)    Interpersonal Safety      Do you feel physically and emotionally safe where you currently live?: Yes      Within the past 12 months, have you been hit, slapped, kicked or otherwise physically hurt by someone?: No      Within the past 12 months, have you been humiliated or emotionally abused in other ways by your partner or ex-partner?: No    Housing Stability: Low Risk  (10/9/2024)    Housing Stability      Do you have housing? : Yes      Are you worried about losing your housing?: No       Outpatient Encounter Medications as of 10/16/2024   Medication Sig Dispense Refill     ascorbic acid (VITAMIN C) 250 MG CHEW chewable tablet Take 250 mg by mouth daily       Black Cohosh 450 MG CAPS Take 450 mg by mouth daily       Calcium Carb-Cholecalciferol (CALCIUM 500 + D PO) Take 500 mg by mouth daily       cyanocobalamin (VITAMIN B-12) 1000 MCG tablet Take 1,000 mcg by mouth daily       docusate sodium (COLACE) 100 MG capsule Take 200 mg by mouth daily       EPINEPHrine (ANY BX GENERIC EQUIV) 0.3 MG/0.3ML injection 2-pack Inject 0.3 mLs (0.3 mg) into the muscle as needed for anaphylaxis 2 each 3     estradiol (ESTRACE) 0.1 MG/GM vaginal cream Place 2 g vaginally twice a week. 42.5 g 4     magnesium citrate solution (NOT currently available) Take 148 mLs by mouth once       Melatonin 10 MG TABS tablet Take 10 mg by mouth At Bedtime       metFORMIN (GLUCOPHAGE) 850 MG tablet Take 1 tablet (850 mg) by mouth daily (with dinner) 90 tablet 3     Psyllium-Calcium (METAMUCIL PLUS CALCIUM) CAPS Take 2 capsules by mouth daily       traZODone (DESYREL) 50 MG tablet Take 1 tablet (50 mg) by mouth at bedtime 90 tablet 1     vitamin D3 (CHOLECALCIFEROL) 50 mcg (2000 units) tablet Take 1 tablet by mouth daily       zolpidem (AMBIEN) 5 MG tablet Take 0.5 tablets (2.5 mg) by mouth nightly as needed for sleep. 30 tablet 1     No facility-administered encounter medications on file as of 10/16/2024.             O:   NAD, WDWN, Alert & Oriented, Mood & Affect wnl, Vitals stable   Here today alone   There were no vitals taken for this visit.   General appearance normal   Vitals stable   Alert, oriented and in no acute distress      Brown stuck stuck papules, macules, with facial telangiectasia   Stuck on papules and brown macules on trunk and ext   Red papules on trunk  Brown  papules and macule with regular pigment network and borders on torso and extremities     The remainder of skin exam is normal       Eyes: Conjunctivae/lids:Normal     ENT: Lips, mucosa: normal    MSK:Normal    Cardiovascular: peripheral edema none    Pulm: Breathing Normal    Neuro/Psych: Orientation:Alert and Orientedx3 ; Mood/Affect:normal     A/P:  1. Seborrheic keratosis, lentigo, angioma, benign nevi   It was a pleasure speaking to Tammy Dennis today.  BENIGN LESIONS DISCUSSED WITH PATIENT:  I discussed the specifics of tumor, prognosis, and genetics of benign lesions.  I explained that treatment of these lesions would be purely cosmetic and not medically neccessary.  I discussed with patient different removal options including excision, cautery and /or laser.      Nature and genetics of benign skin lesions dicussed with patient.  Signs and Symptoms of skin cancer discussed with patient.  ABCDEs of melanoma reviewed with patient.  Patient encouraged to perform monthly skin exams.  UV precautions reviewed with patient.  Risks of non-melanoma skin cancer discussed with patient   Return to clinic in one year or sooner if needed.       Again, thank you for allowing me to participate in the care of your patient.        Sincerely,        Marlen Salazar PA-C

## 2024-12-05 ENCOUNTER — ANCILLARY PROCEDURE (OUTPATIENT)
Dept: MAMMOGRAPHY | Facility: CLINIC | Age: 69
End: 2024-12-05
Attending: FAMILY MEDICINE
Payer: MEDICARE

## 2024-12-05 DIAGNOSIS — Z12.31 VISIT FOR SCREENING MAMMOGRAM: ICD-10-CM

## 2024-12-05 PROCEDURE — 77063 BREAST TOMOSYNTHESIS BI: CPT

## 2024-12-05 PROCEDURE — 77067 SCR MAMMO BI INCL CAD: CPT

## 2024-12-30 DIAGNOSIS — R73.03 PREDIABETES: ICD-10-CM

## 2025-01-27 DIAGNOSIS — G47.00 INSOMNIA, UNSPECIFIED TYPE: ICD-10-CM

## 2025-01-27 RX ORDER — ZOLPIDEM TARTRATE 5 MG/1
2.5 TABLET ORAL
Qty: 30 TABLET | Refills: 2 | Status: SHIPPED | OUTPATIENT
Start: 2025-01-27

## 2025-01-27 NOTE — TELEPHONE ENCOUNTER
"Message to physician:     Date of last visit: 10/14/2024    Date of next visit if scheduled: None    No results found for: \"POTASSIUM\", \"CR\", \"GFRESTIMATED\"    BP Readings from Last 3 Encounters:   10/14/24 111/71   07/18/24 (!) 141/81   04/22/24 111/71       Hemoglobin A1C   Date Value Ref Range Status   10/14/2024 5.8 (H) 0.0 - 5.6 % Final     Comment:     Normal <5.7%   Prediabetes 5.7-6.4%    Diabetes 6.5% or higher     Note: Adopted from ADA consensus guidelines.       Please complete refill and CLOSE ENCOUNTER.  Closing the encounter signifies the refill is complete.   "

## 2025-03-23 ENCOUNTER — OFFICE VISIT (OUTPATIENT)
Dept: URGENT CARE | Facility: URGENT CARE | Age: 70
End: 2025-03-23
Payer: MEDICARE

## 2025-03-23 ENCOUNTER — NURSE TRIAGE (OUTPATIENT)
Dept: NURSING | Facility: CLINIC | Age: 70
End: 2025-03-23
Payer: MEDICARE

## 2025-03-23 VITALS
SYSTOLIC BLOOD PRESSURE: 137 MMHG | OXYGEN SATURATION: 96 % | HEIGHT: 63 IN | DIASTOLIC BLOOD PRESSURE: 82 MMHG | TEMPERATURE: 99 F | WEIGHT: 156 LBS | HEART RATE: 93 BPM | BODY MASS INDEX: 27.64 KG/M2

## 2025-03-23 DIAGNOSIS — R05.1 ACUTE COUGH: Primary | ICD-10-CM

## 2025-03-23 DIAGNOSIS — R06.2 WHEEZING: ICD-10-CM

## 2025-03-23 DIAGNOSIS — J06.9 UPPER RESPIRATORY TRACT INFECTION, UNSPECIFIED TYPE: ICD-10-CM

## 2025-03-23 LAB
FLUAV AG SPEC QL IA: NEGATIVE
FLUBV AG SPEC QL IA: NEGATIVE

## 2025-03-23 PROCEDURE — 3079F DIAST BP 80-89 MM HG: CPT | Performed by: PHYSICIAN ASSISTANT

## 2025-03-23 PROCEDURE — 87804 INFLUENZA ASSAY W/OPTIC: CPT | Performed by: PHYSICIAN ASSISTANT

## 2025-03-23 PROCEDURE — 3075F SYST BP GE 130 - 139MM HG: CPT | Performed by: PHYSICIAN ASSISTANT

## 2025-03-23 PROCEDURE — 99214 OFFICE O/P EST MOD 30 MIN: CPT | Performed by: PHYSICIAN ASSISTANT

## 2025-03-23 RX ORDER — BENZONATATE 200 MG/1
200 CAPSULE ORAL 3 TIMES DAILY PRN
Qty: 30 CAPSULE | Refills: 0 | Status: SHIPPED | OUTPATIENT
Start: 2025-03-23

## 2025-03-23 RX ORDER — GUAIFENESIN 600 MG/1
1200 TABLET, EXTENDED RELEASE ORAL 2 TIMES DAILY
Qty: 30 TABLET | Refills: 0 | Status: SHIPPED | OUTPATIENT
Start: 2025-03-23

## 2025-03-23 RX ORDER — ALBUTEROL SULFATE 90 UG/1
2 INHALANT RESPIRATORY (INHALATION) EVERY 4 HOURS PRN
Qty: 18 G | Refills: 1 | Status: SHIPPED | OUTPATIENT
Start: 2025-03-23

## 2025-03-23 NOTE — TELEPHONE ENCOUNTER
Patient has an URI with a great deal of coughing and congestion for 9 days now and runny nose and today is getting worse.  Wheezing is also present.   Patient will go to [facility].        Reason for Disposition   Wheezing is present    Additional Information   Negative: SEVERE difficulty breathing (e.g., struggling for each breath, speaks in single words)   Negative: Bluish (or gray) lips or face now   Negative: [1] Difficulty breathing AND [2] exposure to flames, smoke, or fumes   Negative: [1] Stridor AND [2] difficulty breathing   Negative: Sounds like a life-threatening emergency to the triager   Negative: [1] Previous asthma attacks AND [2] this feels like asthma attack   Negative: Dry cough (non-productive;  no sputum or minimal clear sputum)   Negative: [1] MODERATE difficulty breathing (e.g., speaks in phrases, SOB even at rest, pulse 100-120) AND [2] still present when not coughing   Negative: Chest pain  (Exception: MILD central chest pain, present only when coughing.)   Negative: Patient sounds very sick or weak to the triager   Negative: [1] MILD difficulty breathing (e.g., minimal/no SOB at rest, SOB with walking, pulse <100) AND [2] still present when not coughing   Negative: [1] Coughed up blood AND [2] > 1 tablespoon (15 ml)   (Exception: Blood-tinged sputum.)   Negative: Fever > 103 F (39.4 C)   Negative: [1] Fever > 101 F (38.3 C) AND [2] age > 60 years   Negative: [1] Fever > 100.0 F (37.8 C) AND [2] bedridden (e.g., CVA, chronic illness, recovering from surgery)   Negative: [1] Fever > 100.0 F (37.8 C) AND [2] diabetes mellitus or weak immune system (e.g., HIV positive, cancer chemo, splenectomy, organ transplant, chronic steroids)    Protocols used: Cough - Acute [id number]

## 2025-03-24 NOTE — PATIENT INSTRUCTIONS
OK to start antibiotic if symptoms not improving after 10-14 days.      Nasal saline, humidification. Mucinex.   Tessalon and mucinex for cough.   If wheezy may try the albuterol     If persistent or worsening, short of breath, chest pain please return or follow up with primary care provider as you may need xray

## 2025-03-24 NOTE — PROGRESS NOTES
Assessment & Plan         Upper respiratory tract infection, unspecified type  Patient was seen for 8-day history of URI symptoms with cough and fever up to 99.  Her exam is reassuring with clear lungs other than a few scattered wheezes.  Her O2 sat is acceptable and no distress of breathing.  She has no facial pain with palpation at this time.  Discussed this is likely viral in etiology recommend continued conservative measures including fluids rest and ibuprofen or Tylenol as needed for comfort.  Continue with mucolytic, Tessalon as ordered, nasal saline, humidification.  If symptoms are persisting or worsening greater than 10 to 14 days may start Augmentin for sinusitis.  - guaiFENesin (MUCINEX) 600 MG 12 hr tablet  Dispense: 30 tablet; Refill: 0  - amoxicillin-clavulanate (AUGMENTIN) 875-125 MG tablet  Dispense: 14 tablet; Refill: 0    Acute cough  - Influenza A & B Antigen - Clinic Collect  - benzonatate (TESSALON) 200 MG capsule  Dispense: 30 capsule; Refill: 0  - albuterol (PROAIR HFA/PROVENTIL HFA/VENTOLIN HFA) 108 (90 Base) MCG/ACT inhaler  Dispense: 18 g; Refill: 1    Wheezing  Will give try of albuterol.  He has used albuterol in the past with upper respiratory symptoms with wheezing.  If severe shortness of breath difficulty breathing or chest pain should return to the clinic for consideration of chest x-ray which was not available today in clinic fortunately.     30 minutes spent by me on the date of the encounter doing chart review, review of test results, interpretation of tests, patient visit, and documentation       Alesia Steinberg PA-C  [facility] MANDA Munoz is a 70 year old female who presents to clinic today for the following health issues:  Chief Complaint   Patient presents with    URI     Coughing a week ago last Saturday, Sunday body aches, and low grad fever on Tuesday. Wednesday and Thursday runny nose. Cough has progressed. Wheezing.           3/23/2025     6:17 PM   Additional Questions   Roomed by MARY ALICE Villatoro   Accompanied by self     HPI  Patient is a 70-year-old female presents to urgent care with concerns regarding persistent URI symptoms x 8 days.  Started 1 week ago.    Initial symptoms started with muscle aches, cough, gesturing.  Was feeling a little better [place] and then got worse mid week.  Congestion, rhinorrhea, ongoing cough. Was feeling feverish, had temp of 99.2.  today increaesd cough, productive. Lots of nasal discharge.  Not feeling sob or wheezy but daughter heard her to be wheezy.    No chest pain.  Some gum pain.    Frontal HA.    Clear nasal discharge.    COVID-19 was done early on in illness and was negative.      Review of Systems  Constitutional, HEENT, cardiovascular, pulmonary, gi and gu systems are negative, except as otherwise noted.    Patient Active Problem List   Diagnosis    Prediabetes    Screening for colorectal cancer    Insomnia    Obstructive sleep apnea syndrome    Breast cancer screening    Screening for osteoporosis    History of actinic keratoses    Lipid screening    Osteoarthritis of right foot    Constipation    Female stress incontinence     Current Outpatient Medications   Medication Sig Dispense Refill    albuterol (PROAIR HFA/PROVENTIL HFA/VENTOLIN HFA) 108 (90 Base) MCG/ACT inhaler Inhale 2 puffs into the lungs every 4 hours as needed for shortness of breath, wheezing or cough. 18 g 1    amoxicillin-clavulanate (AUGMENTIN) 875-125 MG tablet Take 1 tablet by mouth 2 times daily for 7 days. 14 tablet 0    ascorbic acid (VITAMIN C) 250 MG CHEW chewable tablet Take 250 mg by mouth daily      benzonatate (TESSALON) 200 MG capsule Take 1 capsule (200 mg) by mouth 3 times daily as needed for cough. 30 capsule 0    Black Cohosh 450 MG CAPS Take 450 mg by mouth daily      Calcium Carb-Cholecalciferol (CALCIUM 500 + D PO) Take 500 mg by mouth daily      cyanocobalamin (VITAMIN B-12) 1000 MCG  "tablet Take 1,000 mcg by mouth daily      docusate sodium (COLACE) 100 MG capsule Take 200 mg by mouth daily      EPINEPHrine (ANY BX GENERIC EQUIV) 0.3 MG/0.3ML injection 2-pack Inject 0.3 mLs (0.3 mg) into the muscle as needed for anaphylaxis 2 each 3    estradiol (ESTRACE) 0.1 MG/GM vaginal cream Place 2 g vaginally twice a week. 42.5 g 4    guaiFENesin (MUCINEX) 600 MG 12 hr tablet Take 2 tablets (1,200 mg) by mouth 2 times daily. 30 tablet 0    magnesium citrate solution (NOT currently available) Take 148 mLs by mouth once      Melatonin 10 MG TABS tablet Take 10 mg by mouth At Bedtime      metFORMIN (GLUCOPHAGE) 850 MG tablet Take 1 tablet (850 mg) by mouth daily (with dinner). 90 tablet 1    Psyllium-Calcium (METAMUCIL PLUS CALCIUM) CAPS Take 2 capsules by mouth daily      traZODone (DESYREL) 50 MG tablet Take 1 tablet (50 mg) by mouth at bedtime 90 tablet 1    vitamin D3 (CHOLECALCIFEROL) 50 mcg (2000 units) tablet Take 1 tablet by mouth daily      zolpidem (AMBIEN) 5 MG tablet Take 0.5 tablets (2.5 mg) by mouth nightly as needed for sleep. 30 tablet 2     No current facility-administered medications for this visit.             Objective    /82   Pulse 93   Temp 99  F (37.2  C) (Oral)   Ht 1.6 m (5' 3\")   Wt 70.8 kg (156 lb)   SpO2 96%   BMI 27.63 kg/m    Physical Exam   Pt is in no acute distress and appears well  Ears patent B:  TM s intact, non-injected. All land marks easily visibile    Nasal mucosa is edematous,  clear discharge.    Pharynx: non erythematous, tonsils non hypertrophied, No exudate   Neck supple: no adenopathy  Lungs: few scattered wheezes, otherwise clear.  No rhonchi or rales.    Heart: RRR, no murmur, no thrills or heaves   Ext: no edema  Skin: no rashes        Results for orders placed or performed in visit on 03/23/25   Influenza A & B Antigen - Clinic Collect     Status: Normal    Specimen: Nose; Swab   Result Value Ref Range    Influenza A antigen Negative Negative    " Influenza B antigen Negative Negative    Narrative    Test results must be correlated with clinical data. If necessary, results should be confirmed by a molecular assay or viral culture.

## 2025-04-21 DIAGNOSIS — G47.00 INSOMNIA, UNSPECIFIED TYPE: ICD-10-CM

## 2025-04-21 RX ORDER — TRAZODONE HYDROCHLORIDE 50 MG/1
50 TABLET ORAL AT BEDTIME
Qty: 90 TABLET | Refills: 1 | Status: SHIPPED | OUTPATIENT
Start: 2025-04-21

## 2025-06-17 ENCOUNTER — TRANSFERRED RECORDS (OUTPATIENT)
Dept: HEALTH INFORMATION MANAGEMENT | Facility: CLINIC | Age: 70
End: 2025-06-17
Payer: MEDICARE

## 2025-06-17 LAB — RETINOPATHY: NEGATIVE

## 2025-07-09 ENCOUNTER — MYC REFILL (OUTPATIENT)
Dept: FAMILY MEDICINE | Facility: CLINIC | Age: 70
End: 2025-07-09
Payer: MEDICARE

## 2025-07-09 DIAGNOSIS — R73.03 PREDIABETES: ICD-10-CM
